# Patient Record
Sex: MALE | Race: WHITE | NOT HISPANIC OR LATINO | Employment: OTHER | URBAN - METROPOLITAN AREA
[De-identification: names, ages, dates, MRNs, and addresses within clinical notes are randomized per-mention and may not be internally consistent; named-entity substitution may affect disease eponyms.]

---

## 2017-02-07 DIAGNOSIS — L40.8 SEBOPSORIASIS: Primary | ICD-10-CM

## 2017-02-07 DIAGNOSIS — L40.9 PSORIASIS: ICD-10-CM

## 2017-02-07 NOTE — TELEPHONE ENCOUNTER
fluconazole (DIFLUCAN) 150 MG tablet    Date Last Filled: 12/21/2016  QTY: 9    [facility] [place]

## 2017-02-09 RX ORDER — FLUCONAZOLE 150 MG/1
150 TABLET ORAL
Qty: 9 TABLET | Refills: 0 | Status: SHIPPED | OUTPATIENT
Start: 2017-02-09 | End: 2022-09-29

## 2017-02-09 NOTE — TELEPHONE ENCOUNTER
"Another Provider please address in Jeni Thornton PA-C's absence:    Spoke to patient and he stated \"Things really aren't changed since I was seen. They aren't any better at all..\"     Advised he should be seen for a recheck appt and scheduled for follow up.     Ok to refill Fluconazole until seen?...    Please advise. Karolyn Lujan RN    "

## 2017-02-09 NOTE — TELEPHONE ENCOUNTER
Per 12-28-16 dictation:     1. Dermatitis   Apply triamcinolone cream twice daily as needed to rash.   Skin care regimen reviewed with patient: Eliminate harsh soaps, i.e. Dial, zest, irsih spring; Mild soaps such as Cetaphil or Dove sensitive skin, avoid hot or cold showers, aggressive use of emollients including vanicream, cetaphil or cerave discussed with patient.      2. Scalp psoriasis    Apply clobetasol solution with mineral oil to affected area to help soften scaly area. Continue ketoconazole shampoo and alternating with clobetasol shampoo.    Finish course of fluconazole.   Patient does not want to try systemic medication at this time.    Return in one month if not improving.

## 2017-02-22 ENCOUNTER — OFFICE VISIT (OUTPATIENT)
Dept: DERMATOLOGY | Facility: CLINIC | Age: 72
End: 2017-02-22
Payer: COMMERCIAL

## 2017-02-22 VITALS — HEART RATE: 57 BPM | OXYGEN SATURATION: 98 % | SYSTOLIC BLOOD PRESSURE: 141 MMHG | DIASTOLIC BLOOD PRESSURE: 79 MMHG

## 2017-02-22 DIAGNOSIS — L40.9 SCALP PSORIASIS: Primary | ICD-10-CM

## 2017-02-22 PROCEDURE — 11900 INJECT SKIN LESIONS </W 7: CPT | Performed by: PHYSICIAN ASSISTANT

## 2017-02-22 NOTE — PROGRESS NOTES
Suleiman Davis is a 72 year old year old male patient here today for recheck psoriasis on scalp .  Patient reports he has been alternating between clobetasol shampoo and ketoconazole shampoo. He also has finished a course of fluconazole. He only has noticed mild improvement on his scalp. He previous not to do any systemic therapy.  Remainder of the HPI, Meds, PMH, Allergies, FH, and SH was reviewed in chart.    Pertinent Hx:   Psoriasis on scalp   Past Medical History   Diagnosis Date     Basal cell carcinoma        History reviewed. No pertinent past surgical history.     Family History   Problem Relation Age of Onset     CANCER Mother      skin cancer       Social History     Social History     Marital status:      Spouse name: N/A     Number of children: N/A     Years of education: N/A     Occupational History     Not on file.     Social History Main Topics     Smoking status: Never Smoker     Smokeless tobacco: Not on file     Alcohol use Not on file     Drug use: Not on file     Sexual activity: Not on file     Other Topics Concern     Not on file     Social History Narrative       Outpatient Encounter Prescriptions as of 2/22/2017   Medication Sig Dispense Refill     fluconazole (DIFLUCAN) 150 MG tablet Take 1 tablet (150 mg) by mouth every 3 days 9 tablet 0     triamcinolone (KENALOG) 0.1 % cream Apply sparingly to affected area two to three times daily as needed 80 g 1     aspirin 81 MG tablet Take 81 mg by mouth daily       clobetasol propionate 0.05 % SHAM Externally apply topically daily as needed       ketoconazole (NIZORAL) 2 % shampoo Apply to the affected area and wash off after 5 minutes. Use every other day. 120 mL 3     clobetasol propionate 0.05 % SHAM Apply topically daily as needed Apply sparingly to dry scalp once every other day as needed.  Leave in place for 15 minutes then add water, lather and rinse thoroughly. 1 Bottle 1     No facility-administered encounter medications on file as of  2/22/2017.              Review Of Systems  Skin: As above  Eyes: negative  Ears/Nose/Throat: negative  Respiratory: No shortness of breath, dyspnea on exertion, cough, or hemoptysis  Cardiovascular: negative  Gastrointestinal: negative  Genitourinary: negative  Musculoskeletal: negative  Neurologic: negative  Psychiatric: negative  Hematologic/Lymphatic/Immunologic: negative  Endocrine: negative      O:   NAD, WDWN, Alert & Oriented, Mood & Affect wnl, Vitals stable   Here today alone   /79  Pulse 57  SpO2 98%   General appearance normal   Vitals stable   Alert, oriented and in no acute distress      Pink, scaly, thickened plaques on occipital and temporal scalp      Eyes: Conjunctivae/lids:Normal     ENT: Lips    MSK:Normal    Pulm: Breathing Normal    Neuro/Psych: Orientation:Normal; Mood/Affect:Normal  A/P:  1. Scalp psoriasis   IL TAC: PGACAC discussed.  Risks including but not limited to injection site reaction, bruising, no resolution.  All questions answered and entertained to patient s satisfaction.  Informed consent obtained.  IL TAC in concentration of 5 mg/ml was injected ID to scalp psoriasis.  Total injected was  2 ml.  Patient tolerated without complications and given wound care instructions, including not to move product around.  Return in 4 weeks for follow-up and possible additional IL TAC.    Continue alternating shampoos.   Can use tar shampoo as well.   Discussed checking with insurance to see in NB-UVB comb is covered for scalp psoriasis.   Patient does not want to try systemic medication at this time.

## 2017-02-22 NOTE — MR AVS SNAPSHOT
"              After Visit Summary   2017    Suleiman Davis    MRN: [id number]           Patient Information     Date Of Birth          1945        Visit Information        Provider Department      2017 9:40 AM Jeni Thornton PA-C [facility]        Today's Diagnoses     Scalp psoriasis    -  1       Follow-ups after your visit        Who to contact     If you have questions or need follow up information about today's clinic visit or your schedule please contact [facility] directly at [phone].  Normal or non-critical lab and imaging results will be communicated to you by Soma Waterhart, letter or phone within 4 business days after the clinic has received the results. If you do not hear from us within 7 days, please contact the clinic through Datal[employer] or phone. If you have a critical or abnormal lab result, we will notify you by phone as soon as possible.  Submit refill requests through [employer] or call your pharmacy and they will forward the refill request to us. Please allow 3 business days for your refill to be completed.          Additional Information About Your Visit        MyChart Information     Intellution lets you send messages to your doctor, view your test results, renew your prescriptions, schedule appointments and more. To sign up, go to [email or web address] . Click on \"Log in\" on the left side of the screen, which will take you to the Welcome page. Then click on \"Sign up Now\" on the right side of the page.     You will be asked to enter the access code listed below, as well as some personal information. Please follow the directions to create your username and password.     Your access code is: [email or web address]  Expires: 3/6/2017  2:08 PM     Your access code will  in 90 days. If you need help or a new code, please call your [facility] or [phone].        Care EveryWhere ID     This is your Care EveryWhere ID. This could be used by other " organizations to access your [facility] medical records  [id number]        Your Vitals Were     Pulse Pulse Oximetry                57 98%           Blood Pressure from Last 3 Encounters:   02/22/17 141/79   12/28/16 132/79   12/21/16 130/78    Weight from Last 3 Encounters:   No data found for Wt              We Performed the Following     INJECTION INTO SKIN LESIONS <=7     KENALOG PER 10 MG        Primary Care Provider Office Phone # Fax #    Naeem Malcolm [phone]       [facility]  [address] [facility] [address]        Thank you!     Thank you for choosing [facility]  for your care. Our goal is always to provide you with excellent care. Hearing back from our patients is one way we can continue to improve our services. Please take a few minutes to complete the written survey that you may receive in the mail after your visit with us. Thank you!             Your Updated Medication List - Protect others around you: Learn how to safely use, store and throw away your medicines at www.disposemymeds.org.          This list is accurate as of: 2/22/17  1:00 PM.  Always use your most recent med list.                   Brand Name Dispense Instructions for use    aspirin 81 MG tablet      Take 81 mg by mouth daily       * clobetasol propionate 0.05 % Sham      Externally apply topically daily as needed       * clobetasol propionate 0.05 % Sham     1 Bottle    Apply topically daily as needed Apply sparingly to dry scalp once every other day as needed.  Leave in place for 15 minutes then add water, lather and rinse thoroughly.       fluconazole 150 MG tablet    DIFLUCAN    9 tablet    Take 1 tablet (150 mg) by mouth every 3 days       ketoconazole 2 % shampoo    NIZORAL    120 mL    Apply to the affected area and wash off after 5 minutes. Use every other day.       triamcinolone 0.1 % cream    KENALOG    80 g    Apply sparingly to affected area two to three times daily as  needed       * Notice:  This list has 2 medication(s) that are the same as other medications prescribed for you. Read the directions carefully, and ask your doctor or other care provider to review them with you.

## 2017-07-06 ENCOUNTER — OFFICE VISIT (OUTPATIENT)
Dept: DERMATOLOGY | Facility: CLINIC | Age: 72
End: 2017-07-06
Payer: COMMERCIAL

## 2017-07-06 VITALS — DIASTOLIC BLOOD PRESSURE: 73 MMHG | SYSTOLIC BLOOD PRESSURE: 125 MMHG | HEART RATE: 66 BPM | OXYGEN SATURATION: 96 %

## 2017-07-06 DIAGNOSIS — L40.9 PSORIASIS: ICD-10-CM

## 2017-07-06 DIAGNOSIS — L81.4 LENTIGO: ICD-10-CM

## 2017-07-06 DIAGNOSIS — D22.9 NEVUS: Primary | ICD-10-CM

## 2017-07-06 DIAGNOSIS — Z85.828 HISTORY OF BASAL CELL CANCER: ICD-10-CM

## 2017-07-06 DIAGNOSIS — D18.00 ANGIOMA: ICD-10-CM

## 2017-07-06 DIAGNOSIS — L82.1 SEBORRHEIC KERATOSIS: ICD-10-CM

## 2017-07-06 PROCEDURE — 99214 OFFICE O/P EST MOD 30 MIN: CPT | Performed by: PHYSICIAN ASSISTANT

## 2017-07-06 NOTE — NURSING NOTE
Chief Complaint   Patient presents with     Derm Problem     skin check       Vitals:    07/06/17 1032   BP: (!) 121/96   Pulse: 66   SpO2: 96%     Wt Readings from Last 1 Encounters:   No data found for Wt       Victoria Carney LPN.................7/6/2017

## 2017-07-06 NOTE — PROGRESS NOTES
HPI:   Suleiman Davis is a 72 year old male who presents for Full skin cancer screening.  chief complaint  Last Skin Exam: 6 mo ago      1st Baseline: no  Personal HX of Skin Cancer: Yes BCC on left medial canthus 12/2016   Personal HX of Malignant Melanoma: no   Family HX of Skin Cancer / Malignant Melanoma: no  Personal HX of Atypical Moles:   no  Risk factors: Frequent sun exposure; grew up in [place], [place]  New / Changing lesions:no  Social History: Moved to [place] because of job with [employer]  On review of systems, there are no further skin complaints, patient is feeling otherwise well.  See patient intake sheet.  ROS of the following were done and are negative: Constitutional, Eyes, Ears, Nose,   Mouth, Throat, Cardiovascular, Respiratory, GI, Genitourinary, Musculoskeletal,   Psychiatric, Endocrine, Allergic/Immunologic.    PHYSICAL EXAM:   Weight:  BP:   Skin exam performed as follows: Type 2 skin. Mood appropriate  Alert and Oriented X 3. Well developed, well nourished in no distress.  General appearance: Normal  Head including face: Normal  Eyes: conjunctiva and lids: Normal  Mouth: Lips, teeth, gums: Normal  Neck: Normal  Chest-breast/axillae: Normal  Back: Normal  Spleen and liver: Normal  Cardiovascular: Exam of peripheral vascular system by observation for swelling, varicosities, edema: Normal  Genitalia: groin, buttocks: Normal  Extremities: digits/nails (clubbing): Normal  Eccrine and Apocrine glands: Normal  Right upper extremity: Normal  Left upper extremity: Normal  Right lower extremity: Normal  Left lower extremity: Normal  Skin: Scalp and body hair: See below    Pt deferred exam of breasts, groin, buttocks: No    Other physical findings:  1. Multiple pigmented macules on extremities and trunk  2. Multiple pigmented macules on face, trunk and extremities  3. Multiple vascular papules on trunk, arms and legs  4. Multiple scattered keratotic plaques       Except as noted above, no other signs  of skin cancer or melanoma.     ASSESSMENT/PLAN:   Benign Full skin cancer screening today. . Patient with history of BCC  Advised on monthly self exams and 1 year  Patient Education: Appropriate brochures given.    Multiple benign appearing nevi on arms, legs and trunk. Discussed ABCDEs of melanoma and sunscreen.   Multiple lentigos on arms, legs and trunk. Advised benign, no treatment needed.  Multiple scattered angiomas. Advised benign, no treatment needed.   Seborrheic keratosis on arms, legs and trunk. Advised benign, no treatment needed.  Scalp psoriasis - advised on chronic, inflammatory, autoimmune disorder. Approx 4-5% TBSA. Denies any joint sx. Has tried topicals and ILK in the past. Not bothersome to him; he has been using clobetasol scalp solution once per week. He is concerned about potential adrenal suppression with increased use of topical steroids. ILK helped for a few days then returned. He would like to continue present management.   History of BCC on left medial canthus - clear of any signs of recurrence. Continue yearly FSE.         Follow-up: yearly FSE/PRN sooner    1.) Patient was asked about new and changing moles. YES  2.) Patient received a complete physical skin examination: YES  3.) Patient was counseled to perform a monthly self skin examination: YES  Scribed By: Jossie Fontaine MS, PAREMI

## 2017-07-06 NOTE — MR AVS SNAPSHOT
"              After Visit Summary   2017    Suleiman Davis    MRN: [id number]           Patient Information     Date Of Birth          1945        Visit Information        Provider Department      2017 10:30 AM Jossie Fontaine PA-C [facility]        Today's Diagnoses     Nevus    -  1    Lentigo        Angioma        Seborrheic keratosis        History of basal cell cancer        Psoriasis           Follow-ups after your visit        Who to contact     If you have questions or need follow up information about today's clinic visit or your schedule please contact [facility] directly at [phone].  Normal or non-critical lab and imaging results will be communicated to you by [email or web address], letter or phone within 4 business days after the clinic has received the results. If you do not hear from us within 7 days, please contact the clinic through [email or web address] or phone. If you have a critical or abnormal lab result, we will notify you by phone as soon as possible.  Submit refill requests through [employer] or call your pharmacy and they will forward the refill request to us. Please allow 3 business days for your refill to be completed.          Additional Information About Your Visit        MyChart Information     [employer] lets you send messages to your doctor, view your test results, renew your prescriptions, schedule appointments and more. To sign up, go to [email or web address] . Click on \"Log in\" on the left side of the screen, which will take you to the Welcome page. Then click on \"Sign up Now\" on the right side of the page.     You will be asked to enter the access code listed below, as well as some personal information. Please follow the directions to create your username and password.     Your access code is: [email or web address]  Expires: 10/4/2017 12:34 PM     Your access code will  in 90 days. If you need help or a new code, please call your [facility] or [phone].      "   Care EveryWhere ID     This is your Care EveryWhere ID. This could be used by other organizations to access your [facility] medical records  [id number]        Your Vitals Were     Pulse Pulse Oximetry                66 96%           Blood Pressure from Last 3 Encounters:   07/06/17 125/73   02/22/17 141/79   12/28/16 132/79    Weight from Last 3 Encounters:   No data found for Wt              Today, you had the following     No orders found for display       Primary Care Provider Office Phone # Fax #    Naeem MUÑIZ Gold [phone]       [facility]  [address] [facility] [address]        Equal Access to Services     [facility]: Hadii aad ku hadasho Soomaali, waaxda luqadaha, qaybta kaalmada adeegyada, dacia davey . So [facility] [phone].    ATENCIÓN: Si habla español, tiene a mojica disposición servicios gratuitos de asistencia lingüística. Llame al [phone].    We comply with applicable federal civil rights laws and [place] laws. We do not discriminate on the basis of race, color, national origin, age, disability sex, sexual orientation or gender identity.            Thank you!     Thank you for choosing [facility]  for your care. Our goal is always to provide you with excellent care. Hearing back from our patients is one way we can continue to improve our services. Please take a few minutes to complete the written survey that you may receive in the mail after your visit with us. Thank you!             Your Updated Medication List - Protect others around you: Learn how to safely use, store and throw away your medicines at www.disposemymeds.org.          This list is accurate as of: 7/6/17 12:34 PM.  Always use your most recent med list.                   Brand Name Dispense Instructions for use Diagnosis    aspirin 81 MG tablet      Take 81 mg by mouth daily        * clobetasol propionate 0.05 % Sham      Externally apply topically  daily as needed        * clobetasol propionate 0.05 % Sham     1 Bottle    Apply topically daily as needed Apply sparingly to dry scalp once every other day as needed.  Leave in place for 15 minutes then add water, lather and rinse thoroughly.    Sebopsoriasis       fluconazole 150 MG tablet    DIFLUCAN    9 tablet    Take 1 tablet (150 mg) by mouth every 3 days    Psoriasis       ketoconazole 2 % shampoo    NIZORAL    120 mL    Apply to the affected area and wash off after 5 minutes. Use every other day.    Sebopsoriasis       triamcinolone 0.1 % cream    KENALOG    80 g    Apply sparingly to affected area two to three times daily as needed    Dermatitis       * Notice:  This list has 2 medication(s) that are the same as other medications prescribed for you. Read the directions carefully, and ask your doctor or other care provider to review them with you.

## 2018-04-30 ENCOUNTER — TELEPHONE (OUTPATIENT)
Dept: DERMATOLOGY | Facility: CLINIC | Age: 73
End: 2018-04-30

## 2018-04-30 ENCOUNTER — OFFICE VISIT (OUTPATIENT)
Dept: DERMATOLOGY | Facility: CLINIC | Age: 73
End: 2018-04-30
Payer: COMMERCIAL

## 2018-04-30 VITALS — HEART RATE: 56 BPM | SYSTOLIC BLOOD PRESSURE: 134 MMHG | OXYGEN SATURATION: 95 % | DIASTOLIC BLOOD PRESSURE: 72 MMHG

## 2018-04-30 DIAGNOSIS — L72.0 EIC (EPIDERMAL INCLUSION CYST): ICD-10-CM

## 2018-04-30 DIAGNOSIS — L57.0 AK (ACTINIC KERATOSIS): ICD-10-CM

## 2018-04-30 DIAGNOSIS — L82.1 SK (SEBORRHEIC KERATOSIS): ICD-10-CM

## 2018-04-30 DIAGNOSIS — L40.9 SCALP PSORIASIS: Primary | ICD-10-CM

## 2018-04-30 DIAGNOSIS — L81.4 LENTIGO: ICD-10-CM

## 2018-04-30 PROCEDURE — 99213 OFFICE O/P EST LOW 20 MIN: CPT | Mod: 25 | Performed by: DERMATOLOGY

## 2018-04-30 PROCEDURE — 88331 PATH CONSLTJ SURG 1 BLK 1SPC: CPT | Performed by: DERMATOLOGY

## 2018-04-30 PROCEDURE — 11100 HC BIOPSY SKIN/SUBQ/MUC MEM, SINGLE LESION: CPT | Performed by: DERMATOLOGY

## 2018-04-30 RX ORDER — CALCIPOTRIENE 0.05 MG/ML
SOLUTION TOPICAL
Qty: 120 ML | Refills: 11 | Status: SHIPPED | OUTPATIENT
Start: 2018-04-30 | End: 2023-10-02

## 2018-04-30 RX ORDER — KETOCONAZOLE 20 MG/ML
SHAMPOO TOPICAL DAILY PRN
Qty: 240 ML | Refills: 11 | Status: SHIPPED | OUTPATIENT
Start: 2018-04-30 | End: 2023-10-02

## 2018-04-30 RX ORDER — CLOBETASOL PROPIONATE 0.5 MG/ML
SOLUTION TOPICAL 2 TIMES DAILY
Qty: 100 ML | Refills: 11 | Status: SHIPPED | OUTPATIENT
Start: 2018-04-30 | End: 2023-10-02

## 2018-04-30 NOTE — PATIENT INSTRUCTIONS
Wound Care Instructions     FOR SUPERFICIAL WOUNDS     [facility] [phone]    [facility] [phone]                       AFTER 24 HOURS YOU SHOULD REMOVE THE BANDAGE AND BEGIN DAILY DRESSING CHANGES AS FOLLOWS:     1) Remove Dressing.     2) Clean and dry the area with tap water using a Q-tip or sterile gauze pad.     3) Apply Vaseline, Aquaphor, Polysporin ointment or Bacitracin ointment over entire wound.  Do NOT use Neosporin ointment.     4) Cover the wound with a band-aid, or a sterile non-stick gauze pad and micropore paper tape      REPEAT THESE INSTRUCTIONS AT LEAST ONCE A DAY UNTIL THE WOUND HAS COMPLETELY HEALED.    It is an old wives tale that a wound heals better when it is exposed to air and allowed to dry out. The wound will heal faster with a better cosmetic result if it is kept moist with ointment and covered with a bandage.    **Do not let the wound dry out.**      Supplies Needed:      *Cotton tipped applicators (Q-tips)    *Polysporin Ointment or Bacitracin Ointment (NOT NEOSPORIN)    *Band-aids or non-stick gauze pads and micropore paper tape.      PATIENT INFORMATION:    During the healing process you will notice a number of changes. All wounds develop a small halo of redness surrounding the wound.  This means healing is occurring. Severe itching with extensive redness usually indicates sensitivity to the ointment or bandage tape used to dress the wound.  You should call our office if this develops.      Swelling  and/or discoloration around your surgical site is common, particularly when performed around the eye.    All wounds normally drain.  The larger the wound the more drainage there will be.  After 7-10 days, you will notice the wound beginning to shrink and new skin will begin to grow.  The wound is healed when you can see skin has formed over the entire area.  A healed wound has a healthy, shiny look to the surface and is red to dark pink in color  to normalize.  Wounds may take approximately 4-6 weeks to heal.  Larger wounds may take 6-8 weeks.  After the wound is healed you may discontinue dressing changes.    You may experience a sensation of tightness as your wound heals. This is normal and will gradually subside.    Your healed wound may be sensitive to temperature changes. This sensitivity improves with time, but if you re having a lot of discomfort, try to avoid temperature extremes.    Patients frequently experience itching after their wound appears to have healed because of the continue healing under the skin.  Plain Vaseline will help relieve the itching.        POSSIBLE COMPLICATIONS    BLEEDIN. Leave the bandage in place.  2. Use tightly rolled up gauze or a cloth to apply direct pressure over the bandage for 30  minutes.  3. Reapply pressure for an additional 30 minutes if necessary  4. Use additional gauze and tape to maintain pressure once the bleeding has stopped.

## 2018-04-30 NOTE — PROGRESS NOTES
Suleiman Davis is a 73 year old year old male patient here today for tender spot on right axilla, bleeding spot onleft ear.  He also has scalp psoriasis using nizoral and clobetasol with some help.  He is not interested in oral or other treatments.   .  Patient states this has been present for months on ear.  Patient reports the following symptoms:  bleeding.  Patient reports the following previous treatments none.  Patient reports the following modifying factors none.  Associated symptoms: none.  Patient has no other skin complaints today.  Remainder of the HPI, Meds, PMH, Allergies, FH, and SH was reviewed in chart.      Past Medical History:   Diagnosis Date     Basal cell carcinoma        History reviewed. No pertinent surgical history.     Family History   Problem Relation Age of Onset     CANCER Mother      skin cancer       Social History     Social History     Marital status:      Spouse name: N/A     Number of children: N/A     Years of education: N/A     Occupational History     Not on file.     Social History Main Topics     Smoking status: Never Smoker     Smokeless tobacco: Never Used     Alcohol use Not on file     Drug use: Not on file     Sexual activity: Not on file     Other Topics Concern     Not on file     Social History Narrative       Outpatient Encounter Prescriptions as of 4/30/2018   Medication Sig Dispense Refill     aspirin 81 MG tablet Take 81 mg by mouth daily       clobetasol propionate 0.05 % SHAM Externally apply topically daily as needed       clobetasol propionate 0.05 % SHAM Apply topically daily as needed Apply sparingly to dry scalp once every other day as needed.  Leave in place for 15 minutes then add water, lather and rinse thoroughly. 1 Bottle 1     fluconazole (DIFLUCAN) 150 MG tablet Take 1 tablet (150 mg) by mouth every 3 days 9 tablet 0     ketoconazole (NIZORAL) 2 % shampoo Apply to the affected area and wash off after 5 minutes. Use every other day. 120 mL 3      triamcinolone (KENALOG) 0.1 % cream Apply sparingly to affected area two to three times daily as needed 80 g 1     No facility-administered encounter medications on file as of 4/30/2018.              Review Of Systems  Skin: As above  Eyes: negative  Ears/Nose/Throat: negative  Respiratory: No shortness of breath, dyspnea on exertion, cough, or hemoptysis  Cardiovascular: negative  Gastrointestinal: negative  Genitourinary: negative  Musculoskeletal: negative  Neurologic: negative  Psychiatric: negative  Hematologic/Lymphatic/Immunologic: negative  Endocrine: negative      O:   NAD, WDWN, Alert & Oriented, Mood & Affect wnl, Vitals stable   Here today alone   /72 (BP Location: Left arm, Patient Position: Sitting, Cuff Size: Adult Regular)  Pulse 56  SpO2 95%   General appearance normal   Vitals stable   Alert, oriented and in no acute distress     R axilla firm nodule with comedones  Scalp with red plaques   L tragus red eroded scaly papule    Stuck on papules and brown macules on trunk and ext    The remainder of expanded problem focused exam was unremarkable; the following areas were examined:  scalp/hair, conjunctiva/lids, face, neck, lips, back, chest, chest, digits/nails, RUE, LUE.      Eyes: Conjunctivae/lids:Normal     ENT: Lips, buccal mucosa, tongue: normal    MSK:Normal    Cardiovascular: peripheral edema none    Pulm: Breathing Normal    Neuro/Psych: Orientation:Normal; Mood/Affect:Normal      MICRO:     L tragus:There is hyperkeratosis and focal parakeratosis of the epidermis, overlying atypical keratinocytes,  by areas of orthokeratosis, there are scattered basal atypical keratinocytes: with varying degrees of overlying loss of maturation, hyperchromatism, pleomorphism, increased and abnormal mitoses, dyskeratosis.  The dermis shows a variable inflammatory infiltrate.   A/P:  1. L tragus r/o squamous cell carcinoma   TANGENTIAL BIOPSY IN HOUSE:  After consent, anesthesia with LEC and  prep, tangential excision performed and dx above confirmed with frozen section histology.  No complications and routine wound care.  Patient told result actinic keratosis cryo   2. Epidermal cyst schedule excision   3. Seborrheic keratosis, letnigo  4. Scalp psoriasis  mtx discussed with patient he declines  dovonex discussed with patient add topical dovonex  .      BENIGN LESIONS DISCUSSED WITH PATIENT:  I discussed the specifics of tumor, prognosis, and genetics of benign lesions.  I explained that treatment of these lesions would be purely cosmetic and not medically neccessary.  I discussed with patient different removal options including excision, cautery and /or laser.      Nature and genetics of benign skin lesions dicussed with patient.  Signs and Symptoms of skin cancer discussed with patient.  Patient encouraged to perform monthly skin exams.  UV precautions reviewed with patient.  Patient to follow up with Primary Care provider regarding elevated blood pressure.  Skin care regimen reviewed with patient: Eliminate harsh soaps, i.e. Dial, zest, irsih spring; Mild soaps such as Cetaphil or Dove sensitive skin, avoid hot or cold showers, aggressive use of emollients including vanicream, cetaphil or cerave discussed with patient.    Risks of non-melanoma skin cancer discussed with patient   Return to clinic for eic excision

## 2018-04-30 NOTE — TELEPHONE ENCOUNTER
----- Message from Eusebio Rivera MD sent at 4/30/2018 12:11 PM CDT -----  L tragus actinic keratosis cryo

## 2018-04-30 NOTE — LETTER
4/30/2018         RE: Suleiman Davis  [address]   [place] [address]        Dear Colleague,    Thank you for referring your patient, Suleiman Davis, to the [facility]. Please see a copy of my visit note below.    Suleiman Davis is a 73 year old year old male patient here today for tender spot on right axilla, bleeding spot onleft ear.  He also has scalp psoriasis using nizoral and clobetasol with some help.  He is not interested in oral or other treatments.   .  Patient states this has been present for months on ear.  Patient reports the following symptoms:  bleeding.  Patient reports the following previous treatments none.  Patient reports the following modifying factors none.  Associated symptoms: none.  Patient has no other skin complaints today.  Remainder of the HPI, Meds, PMH, Allergies, FH, and SH was reviewed in chart.      Past Medical History:   Diagnosis Date     Basal cell carcinoma        History reviewed. No pertinent surgical history.     Family History   Problem Relation Age of Onset     CANCER Mother      skin cancer       Social History     Social History     Marital status:      Spouse name: N/A     Number of children: N/A     Years of education: N/A     Occupational History     Not on file.     Social History Main Topics     Smoking status: Never Smoker     Smokeless tobacco: Never Used     Alcohol use Not on file     Drug use: Not on file     Sexual activity: Not on file     Other Topics Concern     Not on file     Social History Narrative       Outpatient Encounter Prescriptions as of 4/30/2018   Medication Sig Dispense Refill     aspirin 81 MG tablet Take 81 mg by mouth daily       clobetasol propionate 0.05 % SHAM Externally apply topically daily as needed       clobetasol propionate 0.05 % SHAM Apply topically daily as needed Apply sparingly to dry scalp once every other day as needed.  Leave in place for 15 minutes then add water, lather and rinse thoroughly. 1 Bottle 1      fluconazole (DIFLUCAN) 150 MG tablet Take 1 tablet (150 mg) by mouth every 3 days 9 tablet 0     ketoconazole (NIZORAL) 2 % shampoo Apply to the affected area and wash off after 5 minutes. Use every other day. 120 mL 3     triamcinolone (KENALOG) 0.1 % cream Apply sparingly to affected area two to three times daily as needed 80 g 1     No facility-administered encounter medications on file as of 4/30/2018.              Review Of Systems  Skin: As above  Eyes: negative  Ears/Nose/Throat: negative  Respiratory: No shortness of breath, dyspnea on exertion, cough, or hemoptysis  Cardiovascular: negative  Gastrointestinal: negative  Genitourinary: negative  Musculoskeletal: negative  Neurologic: negative  Psychiatric: negative  Hematologic/Lymphatic/Immunologic: negative  Endocrine: negative      O:   NAD, WDWN, Alert & Oriented, Mood & Affect wnl, Vitals stable   Here today alone   /72 (BP Location: Left arm, Patient Position: Sitting, Cuff Size: Adult Regular)  Pulse 56  SpO2 95%   General appearance normal   Vitals stable   Alert, oriented and in no acute distress     R axilla firm nodule with comedones  Scalp with red plaques   L tragus red eroded scaly papule    Stuck on papules and brown macules on trunk and ext    The remainder of expanded problem focused exam was unremarkable; the following areas were examined:  scalp/hair, conjunctiva/lids, face, neck, lips, back, chest, chest, digits/nails, RUE, LUE.      Eyes: Conjunctivae/lids:Normal     ENT: Lips, buccal mucosa, tongue: normal    MSK:Normal    Cardiovascular: peripheral edema none    Pulm: Breathing Normal    Neuro/Psych: Orientation:Normal; Mood/Affect:Normal      MICRO:     L tragus:There is hyperkeratosis and focal parakeratosis of the epidermis, overlying atypical keratinocytes,  by areas of orthokeratosis, there are scattered basal atypical keratinocytes: with varying degrees of overlying loss of maturation, hyperchromatism,  pleomorphism, increased and abnormal mitoses, dyskeratosis.  The dermis shows a variable inflammatory infiltrate.   A/P:  1. L tragus r/o squamous cell carcinoma   TANGENTIAL BIOPSY IN HOUSE:  After consent, anesthesia with LEC and prep, tangential excision performed and dx above confirmed with frozen section histology.  No complications and routine wound care.  Patient told result actinic keratosis cryo   2. Epidermal cyst schedule excision   3. Seborrheic keratosis, letnigo  4. Scalp psoriasis  mtx discussed with patient he declines  dovonex discussed with patient add topical dovonex  .      BENIGN LESIONS DISCUSSED WITH PATIENT:  I discussed the specifics of tumor, prognosis, and genetics of benign lesions.  I explained that treatment of these lesions would be purely cosmetic and not medically neccessary.  I discussed with patient different removal options including excision, cautery and /or laser.      Nature and genetics of benign skin lesions dicussed with patient.  Signs and Symptoms of skin cancer discussed with patient.  Patient encouraged to perform monthly skin exams.  UV precautions reviewed with patient.  Patient to follow up with Primary Care provider regarding elevated blood pressure.  Skin care regimen reviewed with patient: Eliminate harsh soaps, i.e. Dial, zest, irsih spring; Mild soaps such as Cetaphil or Dove sensitive skin, avoid hot or cold showers, aggressive use of emollients including vanicream, cetaphil or cerave discussed with patient.    Risks of non-melanoma skin cancer discussed with patient   Return to clinic for eic excision       Again, thank you for allowing me to participate in the care of your patient.        Sincerely,        Eusebio Rivera MD

## 2018-04-30 NOTE — MR AVS SNAPSHOT
After Visit Summary   4/30/2018    Suleiman Davis    MRN: [id number]           Patient Information     Date Of Birth          1945        Visit Information        Provider Department      4/30/2018 11:45 AM Eusebio Rivera MD [facility]        Care Instructions          Wound Care Instructions     FOR SUPERFICIAL WOUNDS     [facility] [phone]    [facility] [phone]                       AFTER 24 HOURS YOU SHOULD REMOVE THE BANDAGE AND BEGIN DAILY DRESSING CHANGES AS FOLLOWS:     1) Remove Dressing.     2) Clean and dry the area with tap water using a Q-tip or sterile gauze pad.     3) Apply Vaseline, Aquaphor, Polysporin ointment or Bacitracin ointment over entire wound.  Do NOT use Neosporin ointment.     4) Cover the wound with a band-aid, or a sterile non-stick gauze pad and micropore paper tape      REPEAT THESE INSTRUCTIONS AT LEAST ONCE A DAY UNTIL THE WOUND HAS COMPLETELY HEALED.    It is an old wives tale that a wound heals better when it is exposed to air and allowed to dry out. The wound will heal faster with a better cosmetic result if it is kept moist with ointment and covered with a bandage.    **Do not let the wound dry out.**      Supplies Needed:      *Cotton tipped applicators (Q-tips)    *Polysporin Ointment or Bacitracin Ointment (NOT NEOSPORIN)    *Band-aids or non-stick gauze pads and micropore paper tape.      PATIENT INFORMATION:    During the healing process you will notice a number of changes. All wounds develop a small halo of redness surrounding the wound.  This means healing is occurring. Severe itching with extensive redness usually indicates sensitivity to the ointment or bandage tape used to dress the wound.  You should call our office if this develops.      Swelling  and/or discoloration around your surgical site is common, particularly when performed around the eye.    All wounds normally drain.  The larger  the wound the more drainage there will be.  After 7-10 days, you will notice the wound beginning to shrink and new skin will begin to grow.  The wound is healed when you can see skin has formed over the entire area.  A healed wound has a healthy, shiny look to the surface and is red to dark pink in color to normalize.  Wounds may take approximately 4-6 weeks to heal.  Larger wounds may take 6-8 weeks.  After the wound is healed you may discontinue dressing changes.    You may experience a sensation of tightness as your wound heals. This is normal and will gradually subside.    Your healed wound may be sensitive to temperature changes. This sensitivity improves with time, but if you re having a lot of discomfort, try to avoid temperature extremes.    Patients frequently experience itching after their wound appears to have healed because of the continue healing under the skin.  Plain Vaseline will help relieve the itching.        POSSIBLE COMPLICATIONS    BLEEDIN. Leave the bandage in place.  2. Use tightly rolled up gauze or a cloth to apply direct pressure over the bandage for 30  minutes.  3. Reapply pressure for an additional 30 minutes if necessary  4. Use additional gauze and tape to maintain pressure once the bleeding has stopped.            Follow-ups after your visit        Who to contact     If you have questions or need follow up information about today's clinic visit or your schedule please contact [facility] directly at [phone].  Normal or non-critical lab and imaging results will be communicated to you by Pix[employer], letter or phone within 4 business days after the clinic has received the results. If you do not hear from us within 7 days, please contact the clinic through MyChart or phone. If you have a critical or abnormal lab result, we will notify you by phone as soon as possible.  Submit refill requests through [employer] or call your pharmacy and they will forward the refill  "request to us. Please allow 3 business days for your refill to be completed.          Additional Information About Your Visit        MyChart Information     [employer] lets you send messages to your doctor, view your test results, renew your prescriptions, schedule appointments and more. To sign up, go to [email or web address] . Click on \"Log in\" on the left side of the screen, which will take you to the Welcome page. Then click on \"Sign up Now\" on the right side of the page.     You will be asked to enter the access code listed below, as well as some personal information. Please follow the directions to create your username and password.     Your access code is: [email or web address]  Expires: 2018 11:54 AM     Your access code will  in 90 days. If you need help or a new code, please call your [facility] clinic or [phone].        Care EveryWhere ID     This is your Care EveryWhere ID. This could be used by other organizations to access your [facility] medical records  [id number]        Your Vitals Were     Pulse Pulse Oximetry                56 95%           Blood Pressure from Last 3 Encounters:   18 134/72   17 125/73   17 141/79    Weight from Last 3 Encounters:   No data found for Wt              Today, you had the following     No orders found for display       Primary Care Provider Office Phone # Fax #    Naeem MUÑIZ Gold [phone]       [facility]  [address] [facility] [address]        Equal Access to Services     [facility] HospitalLAMAR : Hadii aad ku hadasho Soomaali, waaxda luqadaha, qaybta kaalmada adeegyada, waxay kayliein mya davey . So [facility] [phone].    ATENCIÓN: Si habla español, tiene a mojica disposición servicios gratuitos de asistencia lingüística. Llame al [phone].    We comply with applicable federal civil rights laws and [place] laws. We do not discriminate on the basis of race, color, national origin, age, disability, sex, " sexual orientation, or gender identity.            Thank you!     Thank you for choosing [facility]  for your care. Our goal is always to provide you with excellent care. Hearing back from our patients is one way we can continue to improve our services. Please take a few minutes to complete the written survey that you may receive in the mail after your visit with us. Thank you!             Your Updated Medication List - Protect others around you: Learn how to safely use, store and throw away your medicines at www.disposemymeds.org.          This list is accurate as of 4/30/18 11:54 AM.  Always use your most recent med list.                   Brand Name Dispense Instructions for use Diagnosis    aspirin 81 MG tablet      Take 81 mg by mouth daily        * clobetasol propionate 0.05 % Sham      Externally apply topically daily as needed        * clobetasol propionate 0.05 % Sham     1 Bottle    Apply topically daily as needed Apply sparingly to dry scalp once every other day as needed.  Leave in place for 15 minutes then add water, lather and rinse thoroughly.    Sebopsoriasis       fluconazole 150 MG tablet    DIFLUCAN    9 tablet    Take 1 tablet (150 mg) by mouth every 3 days    Psoriasis       ketoconazole 2 % shampoo    NIZORAL    120 mL    Apply to the affected area and wash off after 5 minutes. Use every other day.    Sebopsoriasis       triamcinolone 0.1 % cream    KENALOG    80 g    Apply sparingly to affected area two to three times daily as needed    Dermatitis       * Notice:  This list has 2 medication(s) that are the same as other medications prescribed for you. Read the directions carefully, and ask your doctor or other care provider to review them with you.

## 2018-04-30 NOTE — NURSING NOTE
Initial /72 (BP Location: Left arm, Patient Position: Sitting, Cuff Size: Adult Regular)  Pulse 56  SpO2 95% There is no height or weight on file to calculate BMI. .

## 2018-05-01 NOTE — TELEPHONE ENCOUNTER
Message left to return call. Needs return appt with any Derm Provider to treat and can use hold spot. Karolyn Lujan RN

## 2018-05-01 NOTE — TELEPHONE ENCOUNTER
Pt returned call and was advised to schedule an ov with any of the derm providers for treatment.  Trans to scheduling to make this arrangement.    Sandee Chanel  [facility] RN

## 2018-05-10 ENCOUNTER — OFFICE VISIT (OUTPATIENT)
Dept: DERMATOLOGY | Facility: CLINIC | Age: 73
End: 2018-05-10
Payer: COMMERCIAL

## 2018-05-10 DIAGNOSIS — L57.0 ACTINIC KERATOSIS: Primary | ICD-10-CM

## 2018-05-10 PROCEDURE — 17000 DESTRUCT PREMALG LESION: CPT | Performed by: PHYSICIAN ASSISTANT

## 2018-05-10 NOTE — MR AVS SNAPSHOT
After Visit Summary   5/10/2018    Suleiman Davis    MRN: [id number]           Patient Information     Date Of Birth          1945        Visit Information        Provider Department      5/10/2018 2:20 PM Jeni Thornton PA-C [facility]        Today's Diagnoses     Actinic keratosis    -  1      Care Instructions    WOUND CARE INSTRUCTIONS   FOR CRYOSURGERY   This area treated with liquid nitrogen will form a blister. You do not need to bandage the area until after the blister forms and breaks (which may be a few days). When the blister breaks, begin daily dressing changes as follows:   1) Clean and dry the area with tap water using clean Q-tip or sterile gauze pad.   2) Apply Polysporin ointment or Bacitracin ointment over entire wound. Do NOT use Neosporin ointment.   3) Cover the wound with a band-aid or sterile non-stick gauze pad and micropore paper tape.   REPEAT THESE INSTRUCTIONS AT LEAST ONCE A DAY UNTIL THE WOUND HAS COMPLETELY HEALED.   It is an old wives tale that a wound heals better when it is exposed to air and allowed to dry out. The wound will heal faster with a better cosmetic result if it is kept moist with ointment and covered with a bandage.   Do not let the wound dry out.   IMPORTANT INFORMATION ON REVERSE SIDE   Supplies Needed:   *Cotton tipped applicators (Q-tips)   *Polysporin ointment or Bacitracin ointment (NOT NEOSPORIN)   *Band-aids, or non stick gauze pads and micropore paper tape   PATIENT INFORMATION   During the healing process you will notice a number of changes. All wounds develop a small halo of redness surrounding the wound. This means healing is occurring. Severe itching with extensive redness usually indicates sensitivity to the ointment or bandage tape used to dress the wound. You should call our office if this develops.   Swelling and/or discoloration around your surgical site is common, particularly when performed around the eye.    All wounds normally drain. The larger the wound the more drainage there will be. After 7-10 days, you will notice the wound beginning to shrink and new skin will begin to grow. The wound is healed when you can see skin has formed over the entire area. A healed wound has a healthy, shiny look to the surface and is red to dark pink in color to normalize. Wounds may take approximately 4-6 weeks to heal. Larger wounds may take 6-8 weeks. After the wound is healed you may discontinue dressing changes.   You may experience a sensation of tightness as your wound heals. This is normal and will gradually subside.   Your healed wound may be sensitive to temperature changes. This sensitivity improves with time, but if you re having a lot of discomfort, try to avoid temperature extremes.   Patients frequently experience itching after their wound appears to have healed because of the continue healing under the skin. Plain Vaseline will help relieve the itching.                 Follow-ups after your visit        Your next 10 appointments already scheduled     Jul 10, 2018  8:15 AM CDT   PROCEDURE with Eusebio Rivera MD   [facility] ([facility]    [address] [id number] [phone]              Who to contact     If you have questions or need follow up information about today's clinic visit or your schedule please contact [facility] directly at [phone].  Normal or non-critical lab and imaging results will be communicated to you by MyChart, letter or phone within 4 business days after the clinic has received the results. If you do not hear from us within 7 days, please contact the clinic through MyChart or phone. If you have a critical or abnormal lab result, we will notify you by phone as soon as possible.  Submit refill requests through GetThis or call your pharmacy and they will forward the refill request to us. Please allow 3 business days for  your refill to be completed.          Additional Information About Your Visit        MyChart Information     [facility] gives you secure access to your electronic health record. If you see a primary care provider, you can also send messages to your care team and make appointments. If you have questions, please call your primary care clinic.  If you do not have a primary care provider, please call [phone] and they will assist you.        Care EveryWhere ID     This is your Care EveryWhere ID. This could be used by other organizations to access your [facility] medical records  [id number]         Blood Pressure from Last 3 Encounters:   04/30/18 134/72   07/06/17 125/73   02/22/17 141/79    Weight from Last 3 Encounters:   No data found for Wt              We Performed the Following     DESTRUCT PREMALIGNANT LESION, FIRST        Primary Care Provider Office Phone # Fax #    Naeem MUÑIZ Gold [phone]       [facility] [address] [address]        Equal Access to Services     YVONNE DUTTON : Hadii aad ku hadasho Soomaali, waaxda luqadaha, qaybta kaalmada adeegyada, waxay idiin hayyaiman nancy davey . So [facility] [phone].    ATENCIÓN: Si habla español, tiene a mojica disposición servicios gratuitos de asistencia lingüística. Llame al [phone].    We comply with applicable federal civil rights laws and [place] laws. We do not discriminate on the basis of race, color, national origin, age, disability, sex, sexual orientation, or gender identity.            Thank you!     Thank you for choosing [facility]  for your care. Our goal is always to provide you with excellent care. Hearing back from our patients is one way we can continue to improve our services. Please take a few minutes to complete the written survey that you may receive in the mail after your visit with us. Thank you!             Your Updated Medication List - Protect others around you: Learn  how to safely use, store and throw away your medicines at www.disposemymeds.org.          This list is accurate as of 5/10/18 10:48 PM.  Always use your most recent med list.                   Brand Name Dispense Instructions for use Diagnosis    aspirin 81 MG tablet      Take 81 mg by mouth daily        calcipotriene 0.005 % Soln solution    DOVONOX    120 mL    Apply twice daily    Scalp psoriasis, EIC (epidermal inclusion cyst), AK (actinic keratosis), SK (seborrheic keratosis), Lentigo       * clobetasol propionate 0.05 % Sham      Externally apply topically daily as needed        * clobetasol propionate 0.05 % Sham     1 Bottle    Apply topically daily as needed Apply sparingly to dry scalp once every other day as needed.  Leave in place for 15 minutes then add water, lather and rinse thoroughly.    Sebopsoriasis       * clobetasol 0.05 % external solution    TEMOVATE    100 mL    Apply topically 2 times daily    Scalp psoriasis, EIC (epidermal inclusion cyst), AK (actinic keratosis), SK (seborrheic keratosis), Lentigo       fluconazole 150 MG tablet    DIFLUCAN    9 tablet    Take 1 tablet (150 mg) by mouth every 3 days    Psoriasis       * ketoconazole 2 % shampoo    NIZORAL    120 mL    Apply to the affected area and wash off after 5 minutes. Use every other day.    Sebopsoriasis       * ketoconazole 2 % shampoo    NIZORAL    240 mL    Apply topically daily as needed for itching or irritation wsah scalp daily    Scalp psoriasis, EIC (epidermal inclusion cyst), AK (actinic keratosis), SK (seborrheic keratosis), Lentigo       triamcinolone 0.1 % cream    KENALOG    80 g    Apply sparingly to affected area two to three times daily as needed    Dermatitis       * Notice:  This list has 5 medication(s) that are the same as other medications prescribed for you. Read the directions carefully, and ask your doctor or other care provider to review them with you.

## 2018-05-10 NOTE — LETTER
5/10/2018         RE: Suleiman Davis  [address]   [place] [address]        Dear Colleague,    Thank you for referring your patient, Suleiman Davis, to the [facility]. Please see a copy of my visit note below.    Patient is here today to treat actinic keratosis on left tragus.   Patient had biopsy on 4/30 and he is here today for cryo.   1. Actinic Keratosis on left tragus x 1  LN2:  Treated with LN2 for 5s for 1-2 cycles. Warned risks of blistering, pain, pigment change, scarring, and incomplete resolution.  Advised patient to return if lesions do not completely resolve.  Wound care sheet given.      Again, thank you for allowing me to participate in the care of your patient.        Sincerely,        Jeni Miranda PA-C

## 2018-05-11 NOTE — PROGRESS NOTES
Patient is here today to treat actinic keratosis on left tragus.   Patient had biopsy on 4/30 and he is here today for cryo.   1. Actinic Keratosis on left tragus x 1  LN2:  Treated with LN2 for 5s for 1-2 cycles. Warned risks of blistering, pain, pigment change, scarring, and incomplete resolution.  Advised patient to return if lesions do not completely resolve.  Wound care sheet given.

## 2020-03-10 ENCOUNTER — HEALTH MAINTENANCE LETTER (OUTPATIENT)
Age: 75
End: 2020-03-10

## 2020-12-27 ENCOUNTER — HEALTH MAINTENANCE LETTER (OUTPATIENT)
Age: 75
End: 2020-12-27

## 2021-04-24 ENCOUNTER — HEALTH MAINTENANCE LETTER (OUTPATIENT)
Age: 76
End: 2021-04-24

## 2021-05-21 ENCOUNTER — OFFICE VISIT (OUTPATIENT)
Dept: DERMATOLOGY | Facility: CLINIC | Age: 76
End: 2021-05-21
Payer: COMMERCIAL

## 2021-05-21 VITALS — SYSTOLIC BLOOD PRESSURE: 150 MMHG | HEART RATE: 60 BPM | OXYGEN SATURATION: 97 % | DIASTOLIC BLOOD PRESSURE: 81 MMHG

## 2021-05-21 DIAGNOSIS — D22.9 MULTIPLE BENIGN NEVI: ICD-10-CM

## 2021-05-21 DIAGNOSIS — D18.01 CHERRY ANGIOMA: ICD-10-CM

## 2021-05-21 DIAGNOSIS — L57.0 ACTINIC KERATOSIS: Primary | ICD-10-CM

## 2021-05-21 DIAGNOSIS — L81.4 LENTIGO: ICD-10-CM

## 2021-05-21 DIAGNOSIS — L82.1 SEBORRHEIC KERATOSIS: ICD-10-CM

## 2021-05-21 PROCEDURE — 17000 DESTRUCT PREMALG LESION: CPT | Performed by: PHYSICIAN ASSISTANT

## 2021-05-21 PROCEDURE — 99213 OFFICE O/P EST LOW 20 MIN: CPT | Mod: 25 | Performed by: PHYSICIAN ASSISTANT

## 2021-05-21 PROCEDURE — 17003 DESTRUCT PREMALG LES 2-14: CPT | Performed by: PHYSICIAN ASSISTANT

## 2021-05-21 NOTE — PROGRESS NOTES
Suleiman Davis is an extremely pleasant 76 year old year old male patient here today for spot on left cheek. Present for a few months. He denies any pain or bleeding. He reports it was treated with cryo once by PCP but did not fully resolve.  Patient has no other skin complaints today.  Remainder of the HPI, Meds, PMH, Allergies, FH, and SH was reviewed in chart.    Pertinent Hx:   History of BCC  Past Medical History:   Diagnosis Date     Basal cell carcinoma        History reviewed. No pertinent surgical history.     Family History   Problem Relation Age of Onset     Cancer Mother         skin cancer       Social History     Socioeconomic History     Marital status:      Spouse name: Not on file     Number of children: Not on file     Years of education: Not on file     Highest education level: Not on file   Occupational History     Not on file   Social Needs     Financial resource strain: Not on file     Food insecurity     Worry: Not on file     Inability: Not on file     Transportation needs     Medical: Not on file     Non-medical: Not on file   Tobacco Use     Smoking status: Never Smoker     Smokeless tobacco: Never Used   Substance and Sexual Activity     Alcohol use: Not on file     Drug use: Not on file     Sexual activity: Not on file   Lifestyle     Physical activity     Days per week: Not on file     Minutes per session: Not on file     Stress: Not on file   Relationships     Social connections     Talks on phone: Not on file     Gets together: Not on file     Attends [employer] service: Not on file     Active member of club or organization: Not on file     Attends meetings of clubs or organizations: Not on file     Relationship status: Not on file     Intimate partner violence     Fear of current or ex partner: Not on file     Emotionally abused: Not on file     Physically abused: Not on file     Forced sexual activity: Not on file   Other Topics Concern     Not on file   Social History  Narrative     Not on file       Outpatient Encounter Medications as of 5/21/2021   Medication Sig Dispense Refill     aspirin 81 MG tablet Take 81 mg by mouth daily       calcipotriene (DOVONOX) 0.005 % SOLN solution Apply twice daily (Patient not taking: Reported on 5/21/2021) 120 mL 11     clobetasol (TEMOVATE) 0.05 % external solution Apply topically 2 times daily (Patient not taking: Reported on 5/21/2021) 100 mL 11     clobetasol propionate 0.05 % SHAM Externally apply topically daily as needed       clobetasol propionate 0.05 % SHAM Apply topically daily as needed Apply sparingly to dry scalp once every other day as needed.  Leave in place for 15 minutes then add water, lather and rinse thoroughly. (Patient not taking: Reported on 5/21/2021) 1 Bottle 1     fluconazole (DIFLUCAN) 150 MG tablet Take 1 tablet (150 mg) by mouth every 3 days (Patient not taking: Reported on 5/21/2021) 9 tablet 0     ketoconazole (NIZORAL) 2 % shampoo Apply topically daily as needed for itching or irritation wsah scalp daily 240 mL 11     ketoconazole (NIZORAL) 2 % shampoo Apply to the affected area and wash off after 5 minutes. Use every other day. 120 mL 3     triamcinolone (KENALOG) 0.1 % cream Apply sparingly to affected area two to three times daily as needed (Patient not taking: Reported on 5/21/2021) 80 g 1     No facility-administered encounter medications on file as of 5/21/2021.              O:   NAD, WDWN, Alert & Oriented, Mood & Affect wnl, Vitals stable   Here today alone   BP (!) 150/81 (BP Location: Right arm, Cuff Size: Adult Large)   Pulse 60   SpO2 97%    General appearance normal   Vitals stable   Alert, oriented and in no acute distress     Gritty papules x 2 on left lateral cheek and right cheek   Stuck on papules and brown macules on trunk and ext   Red papules on trunk  Brown papules and macules with regular pigment network and borders on torso and extremities      The remainder of skin exam is normal        Eyes: Conjunctivae/lids:Normal     ENT: Lips: normal    MSK:Normal    Cardiovascular: peripheral edema none    Pulm: Breathing Normal    Neuro/Psych: Orientation:Alert and Orientedx3 ; Mood/Affect:normal     A/P:  1. Actinic keratosis x 3 on face  LN2:  Treated with LN2 for 5s for 1-2 cycles. Warned risks of blistering, pain, pigment change, scarring, and incomplete resolution.  Advised patient to return if lesions do not completely resolve.  Wound care sheet given.  Return in one month if not resolved.   2. Seborrheic keratosis, lentigo, angioma, benign nevi, History of BCC  BENIGN LESIONS DISCUSSED WITH PATIENT:  I discussed the specifics of tumor, prognosis, and genetics of benign lesions.  I explained that treatment of these lesions would be purely cosmetic and not medically neccessary.  I discussed with patient different removal options including excision, cautery and /or laser.      Nature and genetics of benign skin lesions dicussed with patient.  Signs and Symptoms of skin cancer discussed with patient.  ABCDEs of melanoma reviewed with patient.  Patient encouraged to perform monthly skin exams.  UV precautions reviewed with patient.  Risks of non-melanoma skin cancer discussed with patient   Return to clinic in one year or sooner if needed.  Suleiman to follow up with Primary Care provider regarding elevated blood pressure.

## 2021-05-21 NOTE — LETTER
5/21/2021         RE: Suleiman Davis  [address] [place] [id number]        Dear Colleague,    Thank you for referring your patient, Suleiman Davis, to the [facility]. Please see a copy of my visit note below.    Suleiman Davis is an extremely pleasant 76 year old year old male patient here today for spot on left cheek. Present for a few months. He denies any pain or bleeding. He reports it was treated with cryo once by PCP but did not fully resolve.  Patient has no other skin complaints today.  Remainder of the HPI, Meds, PMH, Allergies, FH, and SH was reviewed in chart.    Pertinent Hx:   History of BCC  Past Medical History:   Diagnosis Date     Basal cell carcinoma        History reviewed. No pertinent surgical history.     Family History   Problem Relation Age of Onset     Cancer Mother         skin cancer       Social History     Socioeconomic History     Marital status:      Spouse name: Not on file     Number of children: Not on file     Years of education: Not on file     Highest education level: Not on file   Occupational History     Not on file   Social Needs     Financial resource strain: Not on file     Food insecurity     Worry: Not on file     Inability: Not on file     Transportation needs     Medical: Not on file     Non-medical: Not on file   Tobacco Use     Smoking status: Never Smoker     Smokeless tobacco: Never Used   Substance and Sexual Activity     Alcohol use: Not on file     Drug use: Not on file     Sexual activity: Not on file   Lifestyle     Physical activity     Days per week: Not on file     Minutes per session: Not on file     Stress: Not on file   Relationships     Social connections     Talks on phone: Not on file     Gets together: Not on file     Attends Yarsani service: Not on file     Active member of club or organization: Not on file     Attends meetings of clubs or organizations: Not on file     Relationship status: Not on file     Intimate  partner violence     Fear of current or ex partner: Not on file     Emotionally abused: Not on file     Physically abused: Not on file     Forced sexual activity: Not on file   Other Topics Concern     Not on file   Social History Narrative     Not on file       Outpatient Encounter Medications as of 5/21/2021   Medication Sig Dispense Refill     aspirin 81 MG tablet Take 81 mg by mouth daily       calcipotriene (DOVONOX) 0.005 % SOLN solution Apply twice daily (Patient not taking: Reported on 5/21/2021) 120 mL 11     clobetasol (TEMOVATE) 0.05 % external solution Apply topically 2 times daily (Patient not taking: Reported on 5/21/2021) 100 mL 11     clobetasol propionate 0.05 % SHAM Externally apply topically daily as needed       clobetasol propionate 0.05 % SHAM Apply topically daily as needed Apply sparingly to dry scalp once every other day as needed.  Leave in place for 15 minutes then add water, lather and rinse thoroughly. (Patient not taking: Reported on 5/21/2021) 1 Bottle 1     fluconazole (DIFLUCAN) 150 MG tablet Take 1 tablet (150 mg) by mouth every 3 days (Patient not taking: Reported on 5/21/2021) 9 tablet 0     ketoconazole (NIZORAL) 2 % shampoo Apply topically daily as needed for itching or irritation wsah scalp daily 240 mL 11     ketoconazole (NIZORAL) 2 % shampoo Apply to the affected area and wash off after 5 minutes. Use every other day. 120 mL 3     triamcinolone (KENALOG) 0.1 % cream Apply sparingly to affected area two to three times daily as needed (Patient not taking: Reported on 5/21/2021) 80 g 1     No facility-administered encounter medications on file as of 5/21/2021.              O:   NAD, WDWN, Alert & Oriented, Mood & Affect wnl, Vitals stable   Here today alone   BP (!) 150/81 (BP Location: Right arm, Cuff Size: Adult Large)   Pulse 60   SpO2 97%    General appearance normal   Vitals stable   Alert, oriented and in no acute distress     Gritty papules x 2 on left lateral cheek  and right cheek   Stuck on papules and brown macules on trunk and ext   Red papules on trunk  Brown papules and macules with regular pigment network and borders on torso and extremities      The remainder of skin exam is normal       Eyes: Conjunctivae/lids:Normal     ENT: Lips: normal    MSK:Normal    Cardiovascular: peripheral edema none    Pulm: Breathing Normal    Neuro/Psych: Orientation:Alert and Orientedx3 ; Mood/Affect:normal     A/P:  1. Actinic keratosis x 3 on face  LN2:  Treated with LN2 for 5s for 1-2 cycles. Warned risks of blistering, pain, pigment change, scarring, and incomplete resolution.  Advised patient to return if lesions do not completely resolve.  Wound care sheet given.  Return in one month if not resolved.   2. Seborrheic keratosis, lentigo, angioma, benign nevi, History of BCC  BENIGN LESIONS DISCUSSED WITH PATIENT:  I discussed the specifics of tumor, prognosis, and genetics of benign lesions.  I explained that treatment of these lesions would be purely cosmetic and not medically neccessary.  I discussed with patient different removal options including excision, cautery and /or laser.      Nature and genetics of benign skin lesions dicussed with patient.  Signs and Symptoms of skin cancer discussed with patient.  ABCDEs of melanoma reviewed with patient.  Patient encouraged to perform monthly skin exams.  UV precautions reviewed with patient.  Risks of non-melanoma skin cancer discussed with patient   Return to clinic in one year or sooner if needed.  Suleiman to follow up with Primary Care provider regarding elevated blood pressure.        Again, thank you for allowing me to participate in the care of your patient.        Sincerely,        Jeni Miranda PA-C

## 2021-10-09 ENCOUNTER — HEALTH MAINTENANCE LETTER (OUTPATIENT)
Age: 76
End: 2021-10-09

## 2022-05-16 ENCOUNTER — HEALTH MAINTENANCE LETTER (OUTPATIENT)
Age: 77
End: 2022-05-16

## 2022-09-11 ENCOUNTER — HEALTH MAINTENANCE LETTER (OUTPATIENT)
Age: 77
End: 2022-09-11

## 2022-09-29 ENCOUNTER — OFFICE VISIT (OUTPATIENT)
Dept: DERMATOLOGY | Facility: CLINIC | Age: 77
End: 2022-09-29
Payer: COMMERCIAL

## 2022-09-29 DIAGNOSIS — L40.9 PSORIASIS: ICD-10-CM

## 2022-09-29 DIAGNOSIS — D18.01 CHERRY ANGIOMA: ICD-10-CM

## 2022-09-29 DIAGNOSIS — L30.9 DERMATITIS: ICD-10-CM

## 2022-09-29 DIAGNOSIS — L57.0 ACTINIC KERATOSIS: Primary | ICD-10-CM

## 2022-09-29 DIAGNOSIS — L82.1 SEBORRHEIC KERATOSIS: ICD-10-CM

## 2022-09-29 DIAGNOSIS — D22.9 MULTIPLE BENIGN NEVI: ICD-10-CM

## 2022-09-29 DIAGNOSIS — Z85.828 HISTORY OF BASAL CELL CANCER: ICD-10-CM

## 2022-09-29 DIAGNOSIS — L81.4 LENTIGO: ICD-10-CM

## 2022-09-29 PROCEDURE — 99213 OFFICE O/P EST LOW 20 MIN: CPT | Mod: 25 | Performed by: PHYSICIAN ASSISTANT

## 2022-09-29 PROCEDURE — 17000 DESTRUCT PREMALG LESION: CPT | Performed by: PHYSICIAN ASSISTANT

## 2022-09-29 RX ORDER — TRIAMCINOLONE ACETONIDE 1 MG/G
CREAM TOPICAL
Qty: 45 G | Refills: 1 | Status: SHIPPED | OUTPATIENT
Start: 2022-09-29 | End: 2023-10-02

## 2022-09-29 ASSESSMENT — PAIN SCALES - GENERAL: PAINLEVEL: NO PAIN (0)

## 2022-09-29 NOTE — LETTER
9/29/2022         RE: Suleiman Davis  [address] [place] [id number]        Dear Colleague,    Thank you for referring your patient, Suleiman Davis, to the [facility]. Please see a copy of my visit note below.    Suleiman Davis is an extremely pleasant 77 year old year old male patient here today for skin check. He notes brown scaly area on left leg. Noticed a few months ago. No pain or bleeding.  Patient has no other skin complaints today.  Remainder of the HPI, Meds, PMH, Allergies, FH, and SH was reviewed in chart.    Pertinent Hx:  History of BCC  Past Medical History:   Diagnosis Date     Basal cell carcinoma        History reviewed. No pertinent surgical history.     Family History   Problem Relation Age of Onset     Cancer Mother         skin cancer       Social History     Socioeconomic History     Marital status:      Spouse name: Not on file     Number of children: Not on file     Years of education: Not on file     Highest education level: Not on file   Occupational History     Not on file   Tobacco Use     Smoking status: Never Smoker     Smokeless tobacco: Never Used   Substance and Sexual Activity     Alcohol use: Not on file     Drug use: Not on file     Sexual activity: Not on file   Other Topics Concern     Not on file   Social History Narrative     Not on file     Social Determinants of Health     Financial Resource Strain: Not on file   Food Insecurity: Not on file   Transportation Needs: Not on file   Physical Activity: Not on file   Stress: Not on file   Social Connections: Not on file   Intimate Partner Violence: Not on file   Housing Stability: Not on file       Outpatient Encounter Medications as of 9/29/2022   Medication Sig Dispense Refill     aspirin 81 MG tablet Take 81 mg by mouth daily (Patient not taking: Reported on 9/29/2022)       calcipotriene (DOVONOX) 0.005 % SOLN solution Apply twice daily (Patient not taking: No sig reported) 120 mL 11      clobetasol (TEMOVATE) 0.05 % external solution Apply topically 2 times daily (Patient not taking: No sig reported) 100 mL 11     clobetasol propionate 0.05 % SHAM Externally apply topically daily as needed (Patient not taking: Reported on 9/29/2022)       clobetasol propionate 0.05 % SHAM Apply topically daily as needed Apply sparingly to dry scalp once every other day as needed.  Leave in place for 15 minutes then add water, lather and rinse thoroughly. (Patient not taking: No sig reported) 1 Bottle 1     ketoconazole (NIZORAL) 2 % shampoo Apply topically daily as needed for itching or irritation wsah scalp daily 240 mL 11     ketoconazole (NIZORAL) 2 % shampoo Apply to the affected area and wash off after 5 minutes. Use every other day. 120 mL 3     triamcinolone (KENALOG) 0.1 % cream Apply sparingly to affected area two to three times daily as needed (Patient not taking: No sig reported) 80 g 1     [DISCONTINUED] fluconazole (DIFLUCAN) 150 MG tablet Take 1 tablet (150 mg) by mouth every 3 days (Patient not taking: No sig reported) 9 tablet 0     No facility-administered encounter medications on file as of 9/29/2022.             O:   NAD, WDWN, Alert & Oriented, Mood & Affect wnl, Vitals stable   Here today alone   There were no vitals taken for this visit.   General appearance normal   Vitals stable   Alert, oriented and in no acute distress     Stuck on papules and brown macules on trunk and ext   Red papules on trunk  Brown papules and macules with regular pigment network and borders on torso and extremities  Gritty papule on left cheek x 1  psoriasiform plaque on left temporal scalp   The remainder of skin exam is normal       Eyes: Conjunctivae/lids:Normal     ENT: Lips: normal    MSK:Normal    Cardiovascular: peripheral edema none    Pulm: Breathing Normal    Neuro/Psych: Orientation:Alert and Orientedx3 ; Mood/Affect:normal     A/P:  1. Actinic keratosis x 1 on left cheek   LN2:  Treated with LN2 for 5s for  1-2 cycles. Warned risks of blistering, pain, pigment change, scarring, and incomplete resolution.  Advised patient to return if lesions do not completely resolve.  Wound care sheet given.  Return in one month if not resolved.   2. Psoriasis   Refilled triamcinolone if needed.   3. Seborrheic keratosis, lentigo, angioma, benign nevi, History of BCC  BENIGN LESIONS DISCUSSED WITH PATIENT:  I discussed the specifics of tumor, prognosis, and genetics of benign lesions.  I explained that treatment of these lesions would be purely cosmetic and not medically neccessary.  I discussed with patient different removal options including excision, cautery and /or laser.       Nature and genetics of benign skin lesions dicussed with patient.  Signs and Symptoms of skin cancer discussed with patient.  ABCDEs of melanoma reviewed with patient.  Patient encouraged to perform monthly skin exams.  UV precautions reviewed with patient.  Risks of non-melanoma skin cancer discussed with patient   Return to clinic in one year or sooner if needed.      Again, thank you for allowing me to participate in the care of your patient.        Sincerely,        Jeni Miranda PA-C

## 2022-09-29 NOTE — NURSING NOTE
Chief Complaint   Patient presents with     Skin Check     Spot on left calf,growing,wanting provider to look at spot       There were no vitals filed for this visit.  Wt Readings from Last 1 Encounters:   No data found for Wt   Fartun Weber MA

## 2022-09-29 NOTE — PROGRESS NOTES
Suleiman Davis is an extremely pleasant 77 year old year old male patient here today for skin check. He notes brown scaly area on left leg. Noticed a few months ago. No pain or bleeding.  Patient has no other skin complaints today.  Remainder of the HPI, Meds, PMH, Allergies, FH, and SH was reviewed in chart.    Pertinent Hx:  History of BCC  Past Medical History:   Diagnosis Date     Basal cell carcinoma        History reviewed. No pertinent surgical history.     Family History   Problem Relation Age of Onset     Cancer Mother         skin cancer       Social History     Socioeconomic History     Marital status:      Spouse name: Not on file     Number of children: Not on file     Years of education: Not on file     Highest education level: Not on file   Occupational History     Not on file   Tobacco Use     Smoking status: Never Smoker     Smokeless tobacco: Never Used   Substance and Sexual Activity     Alcohol use: Not on file     Drug use: Not on file     Sexual activity: Not on file   Other Topics Concern     Not on file   Social History Narrative     Not on file     Social Determinants of Health     Financial Resource Strain: Not on file   Food Insecurity: Not on file   Transportation Needs: Not on file   Physical Activity: Not on file   Stress: Not on file   Social Connections: Not on file   Intimate Partner Violence: Not on file   Housing Stability: Not on file       Outpatient Encounter Medications as of 9/29/2022   Medication Sig Dispense Refill     aspirin 81 MG tablet Take 81 mg by mouth daily (Patient not taking: Reported on 9/29/2022)       calcipotriene (DOVONOX) 0.005 % SOLN solution Apply twice daily (Patient not taking: No sig reported) 120 mL 11     clobetasol (TEMOVATE) 0.05 % external solution Apply topically 2 times daily (Patient not taking: No sig reported) 100 mL 11     clobetasol propionate 0.05 % SHAM Externally apply topically daily as needed (Patient not taking: Reported on  9/29/2022)       clobetasol propionate 0.05 % SHAM Apply topically daily as needed Apply sparingly to dry scalp once every other day as needed.  Leave in place for 15 minutes then add water, lather and rinse thoroughly. (Patient not taking: No sig reported) 1 Bottle 1     ketoconazole (NIZORAL) 2 % shampoo Apply topically daily as needed for itching or irritation wsah scalp daily 240 mL 11     ketoconazole (NIZORAL) 2 % shampoo Apply to the affected area and wash off after 5 minutes. Use every other day. 120 mL 3     triamcinolone (KENALOG) 0.1 % cream Apply sparingly to affected area two to three times daily as needed (Patient not taking: No sig reported) 80 g 1     [DISCONTINUED] fluconazole (DIFLUCAN) 150 MG tablet Take 1 tablet (150 mg) by mouth every 3 days (Patient not taking: No sig reported) 9 tablet 0     No facility-administered encounter medications on file as of 9/29/2022.             O:   NAD, WDWN, Alert & Oriented, Mood & Affect wnl, Vitals stable   Here today alone   There were no vitals taken for this visit.   General appearance normal   Vitals stable   Alert, oriented and in no acute distress     Stuck on papules and brown macules on trunk and ext   Red papules on trunk  Brown papules and macules with regular pigment network and borders on torso and extremities  Gritty papule on left cheek x 1  psoriasiform plaque on left temporal scalp   The remainder of skin exam is normal       Eyes: Conjunctivae/lids:Normal     ENT: Lips: normal    MSK:Normal    Cardiovascular: peripheral edema none    Pulm: Breathing Normal    Neuro/Psych: Orientation:Alert and Orientedx3 ; Mood/Affect:normal     A/P:  1. Actinic keratosis x 1 on left cheek   LN2:  Treated with LN2 for 5s for 1-2 cycles. Warned risks of blistering, pain, pigment change, scarring, and incomplete resolution.  Advised patient to return if lesions do not completely resolve.  Wound care sheet given.  Return in one month if not resolved.   2.  Psoriasis   Refilled triamcinolone if needed.   3. Seborrheic keratosis, lentigo, angioma, benign nevi, History of BCC  BENIGN LESIONS DISCUSSED WITH PATIENT:  I discussed the specifics of tumor, prognosis, and genetics of benign lesions.  I explained that treatment of these lesions would be purely cosmetic and not medically neccessary.  I discussed with patient different removal options including excision, cautery and /or laser.       Nature and genetics of benign skin lesions dicussed with patient.  Signs and Symptoms of skin cancer discussed with patient.  ABCDEs of melanoma reviewed with patient.  Patient encouraged to perform monthly skin exams.  UV precautions reviewed with patient.  Risks of non-melanoma skin cancer discussed with patient   Return to clinic in one year or sooner if needed.

## 2023-06-03 ENCOUNTER — HEALTH MAINTENANCE LETTER (OUTPATIENT)
Age: 78
End: 2023-06-03

## 2023-07-05 ENCOUNTER — TRANSFERRED RECORDS (OUTPATIENT)
Dept: HEALTH INFORMATION MANAGEMENT | Facility: CLINIC | Age: 78
End: 2023-07-05

## 2023-07-11 ENCOUNTER — MEDICAL CORRESPONDENCE (OUTPATIENT)
Dept: HEALTH INFORMATION MANAGEMENT | Facility: CLINIC | Age: 78
End: 2023-07-11

## 2023-08-30 ENCOUNTER — TRANSCRIBE ORDERS (OUTPATIENT)
Dept: OTHER | Age: 78
End: 2023-08-30

## 2023-08-30 DIAGNOSIS — K65.4 MESENTERIC PANNICULITIS (H): Primary | ICD-10-CM

## 2023-08-31 ENCOUNTER — TELEPHONE (OUTPATIENT)
Dept: GASTROENTEROLOGY | Facility: CLINIC | Age: 78
End: 2023-08-31
Payer: COMMERCIAL

## 2023-08-31 NOTE — TELEPHONE ENCOUNTER
Health Call Center    Phone Message    May a detailed message be left on voicemail: yes     Reason for Call: Appointment Intake    Referring Provider Name: Yolanda Eaton, NP @ 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  This visit is being scheduled outside of the expected 1 month window for an urgent visit.  Pt refused first available at [place] or [place] as too far to drive. Requested in person visit at [facility]    Action Taken: Message routed to:  [facility] ([facility]): GI    Travel Screening: Not Applicable

## 2023-09-01 NOTE — TELEPHONE ENCOUNTER
LPN spoke with patient and assisted him with rescheduling appointment with Maricruz Sanchez MD to 9/27/23. Patient was grateful for the call and help getting him in sooner.    Calista Gao LPN

## 2023-09-12 ENCOUNTER — MYC MEDICAL ADVICE (OUTPATIENT)
Dept: GASTROENTEROLOGY | Facility: CLINIC | Age: 78
End: 2023-09-12
Payer: COMMERCIAL

## 2023-09-25 NOTE — TELEPHONE ENCOUNTER
Records Requested     September 25, 2023 11:50 AM  [id number]   Facility  [facility]  Fax: [phone]   Outcome Urgent requests sent to [facility] for imaging to be pushed to PACS.     Update 9/26/23 10:43 AM - Images received and resolved in PACS.        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]     FUTURE VISIT INFORMATION:  Appointment Date: 9/27/23  Appointment Time: 9:20 AM      NOTES STATUS DETAILS   OFFICE NOTE from Referring Provider Care Everywhere 7/11/23 - Yolanda Eaton NP - [facility]    OFFICE NOTE from Other Specialist Care Everywhere 2/16/17- Naeem Malcolm MD - [facility] - Gastric reflux    MEDICATION LIST [facility] Everywhere         COLONOSCOPY Care Everywhere 3/1/12, 4/13/07 - [facility]    PERTINENT LABS Care Everywhere    PATHOLOGY REPORTS (RELATED) Care Everywhere 4/13/07 - polyps    IMAGING (CT, MRI, EGD, MRCP, Small Bowel Follow Through/SBT, MR/CT Enterography) In PACS [facility]:   CT Abdomen Pelvis - 7/5/23  XR Abdomen - 2/28/23

## 2023-09-27 ENCOUNTER — OFFICE VISIT (OUTPATIENT)
Dept: GASTROENTEROLOGY | Facility: CLINIC | Age: 78
End: 2023-09-27
Payer: COMMERCIAL

## 2023-09-27 ENCOUNTER — LAB (OUTPATIENT)
Dept: LAB | Facility: CLINIC | Age: 78
End: 2023-09-27
Payer: COMMERCIAL

## 2023-09-27 ENCOUNTER — PRE VISIT (OUTPATIENT)
Dept: GASTROENTEROLOGY | Facility: CLINIC | Age: 78
End: 2023-09-27

## 2023-09-27 VITALS
DIASTOLIC BLOOD PRESSURE: 75 MMHG | HEIGHT: 72 IN | WEIGHT: 191 LBS | SYSTOLIC BLOOD PRESSURE: 143 MMHG | BODY MASS INDEX: 25.87 KG/M2 | HEART RATE: 71 BPM | OXYGEN SATURATION: 99 %

## 2023-09-27 DIAGNOSIS — K65.4 MESENTERIC PANNICULITIS (H): ICD-10-CM

## 2023-09-27 LAB
ALBUMIN SERPL BCG-MCNC: 4.3 G/DL (ref 3.5–5.2)
ALBUMIN UR-MCNC: NEGATIVE MG/DL
ALP SERPL-CCNC: 62 U/L (ref 40–129)
ALT SERPL W P-5'-P-CCNC: 19 U/L (ref 0–70)
ANION GAP SERPL CALCULATED.3IONS-SCNC: 8 MMOL/L (ref 7–15)
APPEARANCE UR: CLEAR
AST SERPL W P-5'-P-CCNC: 22 U/L (ref 0–45)
BILIRUB DIRECT SERPL-MCNC: <0.2 MG/DL (ref 0–0.3)
BILIRUB SERPL-MCNC: 0.3 MG/DL
BILIRUB UR QL STRIP: NEGATIVE
BUN SERPL-MCNC: 16.4 MG/DL (ref 8–23)
CALCIUM SERPL-MCNC: 9.7 MG/DL (ref 8.8–10.2)
CHLORIDE SERPL-SCNC: 102 MMOL/L (ref 98–107)
COLOR UR AUTO: YELLOW
CREAT SERPL-MCNC: 0.95 MG/DL (ref 0.67–1.17)
CRP SERPL-MCNC: <3 MG/L
DEPRECATED HCO3 PLAS-SCNC: 28 MMOL/L (ref 22–29)
EGFRCR SERPLBLD CKD-EPI 2021: 81 ML/MIN/1.73M2
ERYTHROCYTE [DISTWIDTH] IN BLOOD BY AUTOMATED COUNT: 12.3 % (ref 10–15)
ERYTHROCYTE [SEDIMENTATION RATE] IN BLOOD BY WESTERGREN METHOD: 10 MM/HR (ref 0–20)
FERRITIN SERPL-MCNC: 987 NG/ML (ref 31–409)
FOLATE SERPL-MCNC: 26.2 NG/ML (ref 4.6–34.8)
GLUCOSE SERPL-MCNC: 111 MG/DL (ref 70–99)
GLUCOSE UR STRIP-MCNC: NEGATIVE MG/DL
HCT VFR BLD AUTO: 42.8 % (ref 40–53)
HGB BLD-MCNC: 14.2 G/DL (ref 13.3–17.7)
HGB UR QL STRIP: NEGATIVE
IRON BINDING CAPACITY (ROCHE): 258 UG/DL (ref 240–430)
IRON SATN MFR SERPL: 24 % (ref 15–46)
IRON SERPL-MCNC: 63 UG/DL (ref 61–157)
KETONES UR STRIP-MCNC: NEGATIVE MG/DL
LEUKOCYTE ESTERASE UR QL STRIP: NEGATIVE
MCH RBC QN AUTO: 30.1 PG (ref 26.5–33)
MCHC RBC AUTO-ENTMCNC: 33.2 G/DL (ref 31.5–36.5)
MCV RBC AUTO: 91 FL (ref 78–100)
NITRATE UR QL: NEGATIVE
PH UR STRIP: 5.5 [PH] (ref 5–7)
PLATELET # BLD AUTO: 176 10E3/UL (ref 150–450)
POTASSIUM SERPL-SCNC: 4 MMOL/L (ref 3.4–5.3)
PROT SERPL-MCNC: 6.9 G/DL (ref 6.4–8.3)
RBC # BLD AUTO: 4.72 10E6/UL (ref 4.4–5.9)
RBC #/AREA URNS AUTO: NORMAL /HPF
SODIUM SERPL-SCNC: 138 MMOL/L (ref 135–145)
SP GR UR STRIP: 1.02 (ref 1–1.03)
UROBILINOGEN UR STRIP-ACNC: 0.2 E.U./DL
WBC # BLD AUTO: 5.8 10E3/UL (ref 4–11)
WBC #/AREA URNS AUTO: NORMAL /HPF

## 2023-09-27 PROCEDURE — 86140 C-REACTIVE PROTEIN: CPT

## 2023-09-27 PROCEDURE — 82746 ASSAY OF FOLIC ACID SERUM: CPT

## 2023-09-27 PROCEDURE — 85027 COMPLETE CBC AUTOMATED: CPT

## 2023-09-27 PROCEDURE — 82607 VITAMIN B-12: CPT

## 2023-09-27 PROCEDURE — 83550 IRON BINDING TEST: CPT

## 2023-09-27 PROCEDURE — 82728 ASSAY OF FERRITIN: CPT

## 2023-09-27 PROCEDURE — 99204 OFFICE O/P NEW MOD 45 MIN: CPT | Performed by: INTERNAL MEDICINE

## 2023-09-27 PROCEDURE — 85652 RBC SED RATE AUTOMATED: CPT

## 2023-09-27 PROCEDURE — 81001 URINALYSIS AUTO W/SCOPE: CPT

## 2023-09-27 PROCEDURE — 36415 COLL VENOUS BLD VENIPUNCTURE: CPT

## 2023-09-27 PROCEDURE — 82248 BILIRUBIN DIRECT: CPT

## 2023-09-27 PROCEDURE — 80053 COMPREHEN METABOLIC PANEL: CPT

## 2023-09-27 PROCEDURE — 83540 ASSAY OF IRON: CPT

## 2023-09-27 ASSESSMENT — ENCOUNTER SYMPTOMS
DIZZINESS: 1
VOMITING: 0
NAUSEA: 0
DIARRHEA: 0
FEVER: 0
WEIGHT LOSS: 0
DIAPHORESIS: 0
ABDOMINAL PAIN: 1
BLOOD IN STOOL: 0
HEARTBURN: 1
PSYCHIATRIC NEGATIVE: 1
RESPIRATORY NEGATIVE: 1
MUSCULOSKELETAL NEGATIVE: 1
SHORTNESS OF BREATH: 0
CONSTIPATION: 0
CARDIOVASCULAR NEGATIVE: 1
EYES NEGATIVE: 1

## 2023-09-27 ASSESSMENT — PAIN SCALES - GENERAL: PAINLEVEL: NO PAIN (1)

## 2023-09-27 NOTE — PROGRESS NOTES
GASTROENTEROLOGY NEW PATIENT    CC/REFERRING MD:    Naeem Malcolm    REASON FOR CONSULTATION:   Yolanda Eaton for   Chief Complaint   Patient presents with    New Patient       HISTORY OF PRESENT ILLNESS:    Suleiman Davis is a 78 year old male who is being evaluated in GI clinic today.    Feels shoevling snow triggered it severe abdo pain sometime around 2023. Pain was severe. Continued, 2023 -- CT scan - mesenteric panniculitis noted    Has been using turmeric, diclofenac cream(local), and boswellia in order to reduce the inflammation and pain  Cutting back on boswellia, is able to sleep on both sides - markers to him for improvement    Med hx/  S/p prostatectomy   Colonoscopy - last one maybe 6 yrs ago - was told does not need any more  Kidney stones in age 30s removed with basket device  Appendectomy age 12  Dx'd with gastritis in ER 1974  3/21/23 Status post ultrasound-guided fine-needle aspiration of hypoechoic area in the right submandibular gland.   3/21/23  Status post ultrasound-guided fine-needle aspiration of nodule superior aspect of the left lobe of the thyroid.     Social/  [place] of wine 4 nights a week   from [facility]   53 yrs  6 kids - 4 biologic  [place]  His father lived [place]    Fam hx/  No CRC no colon polyps  No celiac  No IBD  States his F and sis both had malignant masses in the intestinal area  F - hx of alcoholism  M  lung cancer, did not smoke, lived in [place]      I have reviewed and updated the patient's Past Medical History, Social History, Family History and Medication List.    PERTINENT PAST MEDICAL HISTORY:  (I personally reviewed this history with the patient at today's visit)   Past Medical History:   Diagnosis Date    Basal cell carcinoma          PREVIOUS SURGERIES: (I personally reviewed this history with the patient at today's visit)   No past surgical history on file.    ALLERGIES:     Allergies    Allergen Reactions    Meperidine Other (See Comments)     Other reaction(s): Other - Describe In Comment Field  Comment: GI problems, Description:   Comment: GI problems, Description:     Penicillins Rash     Other reaction(s): Other - Describe In Comment Field  Comment: Rash, Description:        PERTINENT MEDICATIONS:  No current outpatient medications on file.    SOCIAL HISTORY:  Social History     Occupational History    Not on file   Tobacco Use    Smoking status: Never    Smokeless tobacco: Never   Substance and Sexual Activity    Alcohol use: Not on file    Drug use: Not on file    Sexual activity: Not on file     Smoked a pipe in his 20s      FAMILY HISTORY:   Family History   Problem Relation Age of Onset    Cancer Mother         skin cancer        Review of Systems  Review of Systems   Constitutional:  Negative for diaphoresis, fever and weight loss.        7 lb weight gain since 3/2023 due to lack of activity due to pain   HENT: Negative.     Eyes: Negative.    Respiratory: Negative.  Negative for shortness of breath.    Cardiovascular: Negative.  Negative for chest pain.   Gastrointestinal:  Positive for abdominal pain and heartburn. Negative for blood in stool, constipation, diarrhea, melena, nausea and vomiting.        2 week ago noted bile in his mouth wile lying down which is unusual  Takes bran with cereal in the morning, has a regular bowel habit, healthy diet   Genitourinary: Negative.    Musculoskeletal: Negative.    Skin:  Negative for rash.   Neurological:  Positive for dizziness.        Vertigo if turns around too quickly   Endo/Heme/Allergies: Negative.    Psychiatric/Behavioral: Negative.          Exam:    BP (!) 143/75   Pulse 71   Ht 1.829 m (6')   Wt 86.6 kg (191 lb)   SpO2 99%   BMI 25.90 kg/m    Physical Exam  Constitutional:       Appearance: Normal appearance.   HENT:      Head: Normocephalic and atraumatic.      Nose: Nose normal. No congestion.      Mouth/Throat:      Mouth:  Mucous membranes are moist.      Pharynx: Oropharynx is clear. No posterior oropharyngeal erythema.   Eyes:      General: No scleral icterus.     Extraocular Movements: Extraocular movements intact.      Conjunctiva/sclera: Conjunctivae normal.      Pupils: Pupils are equal, round, and reactive to light.   Cardiovascular:      Rate and Rhythm: Normal rate and regular rhythm.      Pulses: Normal pulses.      Heart sounds: Normal heart sounds. No murmur heard.     No friction rub. No gallop.   Pulmonary:      Effort: Pulmonary effort is normal. No respiratory distress.      Breath sounds: No wheezing, rhonchi or rales.   Abdominal:      General: Abdomen is flat. Bowel sounds are normal. There is no distension.      Palpations: Abdomen is soft. There is no mass.      Tenderness: There is abdominal tenderness. There is no guarding.      Comments: +ttp LLQ - mild   Musculoskeletal:         General: Normal range of motion.      Cervical back: Normal range of motion and neck supple.      Right lower leg: No edema.      Left lower leg: No edema.   Lymphadenopathy:      Cervical: No cervical adenopathy.   Skin:     General: Skin is warm and dry.      Findings: No rash.   Neurological:      General: No focal deficit present.      Mental Status: He is alert and oriented to person, place, and time.   Psychiatric:         Mood and Affect: Mood normal.         Behavior: Behavior normal.         Thought Content: Thought content normal.         Judgment: Judgment normal.          PERTINENT STUDIES have been reviewed.  3/21/23  A) THYROID, LEFT, ULTRASOUND GUIDED FINE NEEDLE ASPIRATION:   1. Favor benign colloid nodule (limited cellularity)   2. Negative for malignancy    3. See comment     B) SUBMANDIBULAR GLAND, RIGHT, ULTRASOUND-GUIDED FINE NEEDLE ASPIRATION:   1. Non-diagnostic due to insufficient viable epithelium   2. See comment     7/5/2023 CT C/A/P without contrast  1.  Mild stranding in the fat around the root of the small  bowel mesentery consistent with mesenteric panniculitis.   2.  Colonic diverticulosis most pronounced in the sigmoid colon. No CT evidence for acute diverticulitis or abscess. No bowel obstruction or free intraperitoneal air.   3.  Mildly complex right renal cysts, which appear benign and do not need additional follow-up.   4.  Mild diffuse fatty infiltration of the liver.   5.  Small hiatal hernia.   6.  Prostatectomy.       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  We discussed that the main symptoms of MP are nausea, vomiting, abdominal pain and less often weight loss, fever, change on bowel habit ; we discussed the natural progression in that often there is spontaneous resolution however in some cases there is progression to sclerosis which can lead to bowel obstructions or impingement on local vasculature.  We discussed that often there is spontaneous resolution of mesenteric panniculitis on clinical follow up in 3-6 months and in this case we have the benefit of time and his symptoms have indeed improved over time.  We discussed that often mesenteric panniculitis is an incidental finding and deciding that it is contributing to the clinical picture is partially completed by the process of exclusion of other items on the ddx.  We discussed that in his case, topical diclofenac did help the pain so perhaps his symptom of pain is unlikely to be due to mesenteric panniculitis, which is located at the root of the small bowel mesentery.     We discussed that etiologies for MP, if not idiopathic, include a broad ddx of: hx abdo surgery/trauma, gallstones, abdo/pelvic malignancy, vascular disease, infection, autoimmune process, DM, HTN.   -- Ensure age appropriate cancer screenings have been completed.  Patient is s/p  prostatectomy, recent colonoscopy.  We will obtain colonoscopy report for review.  -- labs and UA today, which may direct further evaluative studies  -- repeat CT scan, however this time order with IV contrast.  We discussed that he is at a 3 month follow up right now and he is improving - plan to either complete this study this month or in a month.  -- if imaging study is equivocal other options for further evaluation include PET CT, ex lap with biopsy.      Maricruz Sanchez MD    RTC 3 months    Thank you for this consultation.  It was a pleasure to participate in the care of this patient; please contact us with any further questions.        Addendum:  CT abdo with contrast completed at outside facility. Colonoscopy is not recent.  See MyChart discussion with patient. EGD/colonoscopy recommended and the patient defers for now.  Patient's abdominal pain has substantially improved.  Ferritin is elevated an patient advised to hold etoh and recheck. At future appt plan will be: reconsider egd/colonoscopy, consider follow up imaging with different modality such as MRI abdomen, PET CT /consider discuss with Heme/Onc

## 2023-09-27 NOTE — NURSING NOTE
Chief Complaint   Patient presents with    New Patient       Vitals:    09/27/23 0857   BP: (!) 143/75   Pulse: 71   SpO2: 99%   Weight: 86.6 kg (191 lb)   Height: 1.829 m (6')       Body mass index is 25.9 kg/m .    Blood pressure high. Provider notified.     Margo Hooks

## 2023-09-27 NOTE — TELEPHONE ENCOUNTER
----- Message from Maricruz Sanchez MD sent at 9/27/2023 10:57 AM CDT -----  Regarding: phone number  Hi this patient has mesenteric panniculitis that is improving .  I would like for him to call us with any new or worsening symptoms.  He knows it is likely we will recommend ER if symptoms severe.  Can you send him your phone number, as I did not have your card with me this morning. Maricruz

## 2023-09-27 NOTE — LETTER
2023         RE: Suleiman Davis  [address] [facility] [address]        Dear Colleague,    Thank you for referring your patient, Suleiman Davis, to the [facility] GASTROENTEROLOGY CLINIC [place]. Please see a copy of my visit note below.    GASTROENTEROLOGY NEW PATIENT    CC/REFERRING MD:    Naeem Malcolm    REASON FOR CONSULTATION:   Yolanda Eaton for   Chief Complaint   Patient presents with    New Patient       HISTORY OF PRESENT ILLNESS:    Suleiman Davis is a 78 year old male who is being evaluated in GI clinic today.    Feels shoevling snow triggered it severe abdo pain sometime around 2023. Pain was severe. Continued, 2023 -- CT scan - mesenteric panniculitis noted    Has been using turmeric, diclofenac cream(local), and boswellia in order to reduce the inflammation and pain  Cutting back on boswellia, is able to sleep on both sides - markers to him for improvement    Med hx/  S/p prostatectomy   Colonoscopy - last one maybe 6 yrs ago - was told does not need any more  Kidney stones in age 30s removed with basket device  Appendectomy age 12  Dx'd with gastritis in ER 1974  3/21/23 Status post ultrasound-guided fine-needle aspiration of hypoechoic area in the right submandibular gland.   3/21/23  Status post ultrasound-guided fine-needle aspiration of nodule superior aspect of the left lobe of the thyroid.     Social/  Glass of wine 4 nights a week   from [facility]   53 yrs  6 kids - 4 biologic  [place]  His father lived on the [facility]    Fam hx/  No CRC no colon polyps  No celiac  No IBD  States his F and sis both had malignant masses in the intestinal area  F - hx of alcoholism  M  lung cancer, did not smoke, lived in [place]      I have reviewed and updated the patient's Past Medical History, Social History, Family History and Medication List.    PERTINENT PAST MEDICAL HISTORY:  (I personally reviewed this history with  the patient at today's visit)   Past Medical History:   Diagnosis Date    Basal cell carcinoma          PREVIOUS SURGERIES: (I personally reviewed this history with the patient at today's visit)   No past surgical history on file.    ALLERGIES:     Allergies   Allergen Reactions    Meperidine Other (See Comments)     Other reaction(s): Other - Describe In Comment Field  Comment: GI problems, Description:   Comment: GI problems, Description:     Penicillins Rash     Other reaction(s): Other - Describe In Comment Field  Comment: Rash, Description:        PERTINENT MEDICATIONS:  No current outpatient medications on file.    SOCIAL HISTORY:  Social History     Occupational History    Not on file   Tobacco Use    Smoking status: Never    Smokeless tobacco: Never   Substance and Sexual Activity    Alcohol use: Not on file    Drug use: Not on file    Sexual activity: Not on file     Smoked a pipe in his 20s      FAMILY HISTORY:   Family History   Problem Relation Age of Onset    Cancer Mother         skin cancer        Review of Systems  Review of Systems   Constitutional:  Negative for diaphoresis, fever and weight loss.        7 lb weight gain since 3/2023 due to lack of activity due to pain   HENT: Negative.     Eyes: Negative.    Respiratory: Negative.  Negative for shortness of breath.    Cardiovascular: Negative.  Negative for chest pain.   Gastrointestinal:  Positive for abdominal pain and heartburn. Negative for blood in stool, constipation, diarrhea, melena, nausea and vomiting.        2 week ago noted bile in his mouth wile lying down which is unusual  Takes bran with cereal in the morning, has a regular bowel habit, healthy diet   Genitourinary: Negative.    Musculoskeletal: Negative.    Skin:  Negative for rash.   Neurological:  Positive for dizziness.        Vertigo if turns around too quickly   Endo/Heme/Allergies: Negative.    Psychiatric/Behavioral: Negative.          Exam:    BP (!) 143/75   Pulse 71    Ht 1.829 m (6')   Wt 86.6 kg (191 lb)   SpO2 99%   BMI 25.90 kg/m    Physical Exam  Constitutional:       Appearance: Normal appearance.   HENT:      Head: Normocephalic and atraumatic.      Nose: Nose normal. No congestion.      Mouth/Throat:      Mouth: Mucous membranes are moist.      Pharynx: Oropharynx is clear. No posterior oropharyngeal erythema.   Eyes:      General: No scleral icterus.     Extraocular Movements: Extraocular movements intact.      Conjunctiva/sclera: Conjunctivae normal.      Pupils: Pupils are equal, round, and reactive to light.   Cardiovascular:      Rate and Rhythm: Normal rate and regular rhythm.      Pulses: Normal pulses.      Heart sounds: Normal heart sounds. No murmur heard.     No friction rub. No gallop.   Pulmonary:      Effort: Pulmonary effort is normal. No respiratory distress.      Breath sounds: No wheezing, rhonchi or rales.   Abdominal:      General: Abdomen is flat. Bowel sounds are normal. There is no distension.      Palpations: Abdomen is soft. There is no mass.      Tenderness: There is abdominal tenderness. There is no guarding.      Comments: +ttp LLQ - mild   Musculoskeletal:         General: Normal range of motion.      Cervical back: Normal range of motion and neck supple.      Right lower leg: No edema.      Left lower leg: No edema.   Lymphadenopathy:      Cervical: No cervical adenopathy.   Skin:     General: Skin is warm and dry.      Findings: No rash.   Neurological:      General: No focal deficit present.      Mental Status: He is alert and oriented to person, place, and time.   Psychiatric:         Mood and Affect: Mood normal.         Behavior: Behavior normal.         Thought Content: Thought content normal.         Judgment: Judgment normal.          PERTINENT STUDIES have been reviewed.  3/21/23  A) THYROID, LEFT, ULTRASOUND GUIDED FINE NEEDLE ASPIRATION:   1. Favor benign colloid nodule (limited cellularity)   2. Negative for malignancy    3. See  comment     B) SUBMANDIBULAR GLAND, RIGHT, ULTRASOUND-GUIDED FINE NEEDLE ASPIRATION:   1. Non-diagnostic due to insufficient viable epithelium   2. See comment     7/5/2023 CT C/A/P without contrast  1.  Mild stranding in the fat around the root of the small bowel mesentery consistent with mesenteric panniculitis.   2.  Colonic diverticulosis most pronounced in the sigmoid colon. No CT evidence for acute diverticulitis or abscess. No bowel obstruction or free intraperitoneal air.   3.  Mildly complex right renal cysts, which appear benign and do not need additional follow-up.   4.  Mild diffuse fatty infiltration of the liver.   5.  Small hiatal hernia.   6.  Prostatectomy.       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  We discussed that the main symptoms of MP are nausea, vomiting, abdominal pain and less often weight loss, fever, change on bowel habit ; we discussed the natural progression in that often there is spontaneous resolution however in some cases there is progression to sclerosis which can lead to bowel obstructions or impingement on local vasculature.  We discussed that often there is spontaneous resolution of mesenteric panniculitis on clinical follow up in 3-6 months and in this case we have the benefit of time and his symptoms have indeed improved over time.  We discussed that often mesenteric panniculitis is an incidental finding and deciding that it is contributing to the clinical picture is partially completed by the process of exclusion of other items on the ddx.  We discussed that in his case, topical diclofenac did help the pain so perhaps his symptom of pain is unlikely to be due to mesenteric panniculitis, which is located at the root of the small bowel mesentery.     We discussed that  etiologies for MP, if not idiopathic, include a broad ddx of: hx abdo surgery/trauma, gallstones, abdo/pelvic malignancy, vascular disease, infection, autoimmune process, DM, HTN.   -- Ensure age appropriate cancer screenings have been completed.  Patient is s/p prostatectomy, recent colonoscopy.  We will obtain colonoscopy report for review.  -- labs and UA today, which may direct further evaluative studies  -- repeat CT scan, however this time order with IV contrast.  We discussed that he is at a 3 month follow up right now and he is improving - plan to either complete this study this month or in a month.  -- if imaging study is equivocal other options for further evaluation include PET CT, ex lap with biopsy.    RTC 3 months    Thank you for this consultation.  It was a pleasure to participate in the care of this patient; please contact us with any further questions.            Again, thank you for allowing me to participate in the care of your patient.      Sincerely,    Maricruz Sanchez MD

## 2023-09-28 LAB — VIT B12 SERPL-MCNC: 745 PG/ML (ref 232–1245)

## 2023-10-06 NOTE — RESULT ENCOUNTER NOTE
Labs: ferritin elevated in context of normal iron sat and also of note unremarkable ESR and CRP.  Folate is robust.  Plan: as patient is still experiencing abdominal pain, CT abdo/pelvis with contrast; afterwards consider other items on ddx for elevated ferritin.

## 2023-10-09 ENCOUNTER — DOCUMENTATION ONLY (OUTPATIENT)
Dept: GASTROENTEROLOGY | Facility: CLINIC | Age: 78
End: 2023-10-09
Payer: COMMERCIAL

## 2023-10-09 ENCOUNTER — TELEPHONE (OUTPATIENT)
Dept: GASTROENTEROLOGY | Facility: CLINIC | Age: 78
End: 2023-10-09
Payer: COMMERCIAL

## 2023-10-09 DIAGNOSIS — K65.4 MESENTERIC PANNICULITIS (H): Primary | ICD-10-CM

## 2023-10-09 NOTE — TELEPHONE ENCOUNTER
----- Message from Yael Garcia RN sent at 10/9/2023  9:33 AM CDT -----  Regarding: RE: follow up  Hello,   Can you please fax the CT order to [facility] in [facility]. Can you also obtain his last colonoscopy record from [facility].   Thank you,   Agata  ----- Message -----  From: Maricruz Sanchez MD  Sent: 10/6/2023  11:28 AM CDT  To: Yael Garcia RN  Subject: follow up                                        Hi Agata,    I agree with you regarding your last note on his [employer] message - next step is CT abdo/pelvis with contrast -- can you order.  I wrote the patient a message.  The CT scan can be completed locally.    Also I cannot remember if I asked for his last colonoscopy report for review.    Thank you  Maricruz

## 2023-10-09 NOTE — TELEPHONE ENCOUNTER
----- Message from Maricruz Sanchez MD sent at 10/6/2023 11:15 AM CDT -----  Regarding: follow up  Hi Agata,    I agree with you regarding your last note on his [employer] message - next step is CT abdo/pelvis with contrast -- can you order.  I wrote the patient a message.  The CT scan can be completed locally.    Also I cannot remember if I asked for his last colonoscopy report for review.    Thank you  Maricruz

## 2023-10-09 NOTE — PROGRESS NOTES
Called to request copies of colonoscopy report. [facility] Representative noted that if records were older than 10 years then they would not be able to send anything through; retention for facility is limited to past 10 years.     [facility] reports colonoscopy from 3-1-12 and 4-13-07. Patient claims they may have had one within the past six years.     Faxed request per clinic protocol. Will follow-up as needed. Relayed information to RN[facility].    Records Requested:  Colonoscopy report    Facility Information:  [address] [address]  Phone #: [phone]  [facility] Fax #: [phone]      SK

## 2023-10-09 NOTE — TELEPHONE ENCOUNTER
Order placed for CT. Requested support pool fax to [facility] and obtain colonoscopy records from [facility].

## 2023-10-11 ENCOUNTER — TELEPHONE (OUTPATIENT)
Dept: GASTROENTEROLOGY | Facility: CLINIC | Age: 78
End: 2023-10-11
Payer: COMMERCIAL

## 2023-10-11 NOTE — PROGRESS NOTES
Called Patient to inquire about where colonoscopies were completed.    Patient stated that they only had two colonoscopies completed and both were done at the [facility].    Will relay information to Dr. Sanchez.      SK

## 2023-10-11 NOTE — TELEPHONE ENCOUNTER
Spoke with patient and scheduled the follow-up order per Dr. Sanchez. Patient is scheduled for an in person visit with [facility], Jossie De La Cruz on 2/2/24.

## 2023-10-11 NOTE — PROGRESS NOTES
Called to follow-up on request for records.     [facility] Representative reported that they did not have any colonoscopy reports to send from the past 10 years.     Will relay information to Dr. Sanchez.     Records Requested:  Colonoscopy report     Facility Information:  [address] [address]  Phone #: [phone]  [facility] Fax #: [phone]        SK

## 2023-10-23 ENCOUNTER — TRANSFERRED RECORDS (OUTPATIENT)
Dept: HEALTH INFORMATION MANAGEMENT | Facility: CLINIC | Age: 78
End: 2023-10-23
Payer: COMMERCIAL

## 2023-11-13 ENCOUNTER — MYC MEDICAL ADVICE (OUTPATIENT)
Dept: GASTROENTEROLOGY | Facility: CLINIC | Age: 78
End: 2023-11-13
Payer: COMMERCIAL

## 2023-11-13 NOTE — TELEPHONE ENCOUNTER
Dr. Sanchez,  Please review CT scan done on 10/23/23. Can be found in PACS and Media.    Pt is asking for your recommendation and interpretation.    Thank you.  Sameera

## 2023-11-13 NOTE — TELEPHONE ENCOUNTER
Hello Team,  Please obtain CT scan done 10/23/23 at [facility]. We have the fax report. Can we get  a copy of the scan?    Facility Information:  [address] [address]  Phone #: [phone]  [facility] Fax #: [phone]    Thank you.  Sameera

## 2023-12-15 ENCOUNTER — TELEPHONE (OUTPATIENT)
Dept: GASTROENTEROLOGY | Facility: CLINIC | Age: 78
End: 2023-12-15
Payer: COMMERCIAL

## 2023-12-15 NOTE — TELEPHONE ENCOUNTER
LVM and sent MyC regarding the following:     Appointment on 02/02/2024 with Jossie De La Cruz PA-C will need to be rescheduled due to provider unavailability that day. Pt was recommended to follow-up with [facility] so can reschedule with them and not Jossie MÁRQUEZ    Left K Pod number

## 2024-03-06 ENCOUNTER — OFFICE VISIT (OUTPATIENT)
Dept: GASTROENTEROLOGY | Facility: CLINIC | Age: 79
End: 2024-03-06
Attending: INTERNAL MEDICINE
Payer: COMMERCIAL

## 2024-03-06 VITALS
HEIGHT: 72 IN | WEIGHT: 187.5 LBS | BODY MASS INDEX: 25.4 KG/M2 | SYSTOLIC BLOOD PRESSURE: 161 MMHG | DIASTOLIC BLOOD PRESSURE: 83 MMHG | HEART RATE: 64 BPM

## 2024-03-06 DIAGNOSIS — K65.4 MESENTERIC PANNICULITIS (H): Primary | ICD-10-CM

## 2024-03-06 DIAGNOSIS — R79.9 ABNORMAL FINDING OF BLOOD CHEMISTRY, UNSPECIFIED: ICD-10-CM

## 2024-03-06 PROCEDURE — 99215 OFFICE O/P EST HI 40 MIN: CPT | Performed by: PHYSICIAN ASSISTANT

## 2024-03-06 ASSESSMENT — PAIN SCALES - GENERAL: PAINLEVEL: NO PAIN (0)

## 2024-03-06 NOTE — LETTER
"    3/6/2024         RE: Suleiman Davis  [address] [place] [id number]        Dear Colleague,    Thank you for referring your patient, Suleiman Davis, to the [facility] GASTROENTEROLOGY CLINIC [place]. Please see a copy of my visit note below.      GI CLINIC VISIT    HPI: 79 year old male with PMH of prostate ca s/p prostatectomy  presenting to GI clinic for abd pain. Last seen with Dr AMNA Sanchez 9/2023 - today is my first meet with the patient.     Dr Sanchez note   Feels shoevling snow triggered it severe abdo pain sometime around March 2023. Pain was severe. Continued, July 5 2023 -- CT scan - mesenteric panniculitis noted     Has been using turmeric, diclofenac cream(local), and boswellia in order to reduce the inflammation and pain  Cutting back on boswellia, is able to sleep on both sides - markers to him for improvement     Med hx/  S/p prostatectomy 2011  Colonoscopy - last one maybe 6 yrs ago - was told does not need any more  Kidney stones in age 30s removed with basket device  Appendectomy age 12  Dx'd with gastritis in ER 1974  3/21/23 Status post ultrasound-guided fine-needle aspiration of hypoechoic area in the right submandibular gland.   3/21/23  Status post ultrasound-guided fine-needle aspiration of nodule superior aspect of the left lobe of the thyroid.      Today 3/6/24    1. Shares his sx started march 2023 after shoveling snow. Severe pain which continued for months. As of lately, he has not had any abdominal pain, discomfort or \"twinges\" any further. It went away slowly and starting in Fall 2023, he was able to start his stretches again. Today he is back to baseline functional health without limitations.   -when he had pain, he was taking topical diclofenac which helped  -stopped tumeric and supplement in jan 2024     2. There is a question about his increased ferritin in setting of normal iron levels and liver enzymes. He stopped drinking ETOH in Dec 2023 and is watching his diet (avoid " "kayla, for example). His weight at home is around 180-183#.     3. GERD -  Had about 8x episodes of heartburn since his last appt with our team which is not normal for him. They were short lived. No dysphagia, odynphagia, melena.     4. Stooling - regular, once daily and formed. No blood or black. Adds bran flakes in diet which helps.   -sporadic leakage of stools which is not new (ongoing for years) and he attributes to intake of certain dietary triggers  -shares he can use the toilet, feel \"sticky\" back there and when he wipes again, sees stools on the TP. This does not concern him and he is not interested in eval.     5. He is retiring soon as his wife is needing more hands on assistance for her daily activites. She appears to have a neurocognitive disorder. He prefers to avoid any invasive procedures.     6. No other questions or concerns     ROS: 10pt ROS performed and otherwise negative.    PAST MEDICAL HISTORY:  Past Medical History:   Diagnosis Date    Basal cell carcinoma        PREVIOUS ABDOMINAL/GYNECOLOGIC SURGERIES:    No past surgical history on file.      PERTINENT MEDICATIONS:  No current outpatient medications on file.     SOCIAL HISTORY:    Social History     Socioeconomic History    Marital status:      Spouse name: Not on file    Number of children: Not on file    Years of education: Not on file    Highest education level: Not on file   Occupational History    Not on file   Tobacco Use    Smoking status: Never    Smokeless tobacco: Never   Substance and Sexual Activity    Alcohol use: Not on file    Drug use: Not on file    Sexual activity: Not on file   Other Topics Concern    Not on file   Social History Narrative    Not on file     Social Determinants of Health     Financial Resource Strain: Not on file   Food Insecurity: Not on file   Transportation Needs: Not on file   Physical Activity: Not on file   Stress: Not on file   Social Connections: Not on file   Interpersonal Safety: Not on " file   Housing Stability: Not on file       FAMILY HISTORY:    Family History   Problem Relation Age of Onset    Cancer Mother         skin cancer       PHYSICAL EXAMINATION:  Vitals reviewed: BP (!) 161/83 (BP Location: Right arm, Patient Position: Sitting, Cuff Size: Adult Regular)   Pulse 64   Ht 1.829 m (6')   Wt 85 kg (187 lb 8 oz)   BMI 25.43 kg/m    Wt:   Wt Readings from Last 2 Encounters:   09/27/23 86.6 kg (191 lb)      Constitutional: aaox3, cooperative, pleasant, not dyspneic/diaphoretic, no acute distress  Eyes: Sclera anicteric/injected  Ears/nose/mouth/throat: hearing intact  Neck: supple, active ROM w/o limitation or pain   CV: No edema  Respiratory: Unlabored breathing  Abd:  Nondistended, +bs, no hepatosplenomegaly, nontender, no peritoneal signs, neg Castlilo's sign   Skin: warm, perfused, no jaundice  Psych: Normal affect  MSK: Normal gait     PERTINENT STUDIES - Reviewed in EMR     Lab Results   Component Value Date    WBC 5.8 09/27/2023    HGB 14.2 09/27/2023     09/27/2023    ALT 19 09/27/2023    AST 22 09/27/2023     09/27/2023    BUN 16.4 09/27/2023    CO2 28 09/27/2023        Liver Function Studies -   Recent Labs   Lab Test 09/27/23  1602   PROTTOTAL 6.9   ALBUMIN 4.3   BILITOTAL 0.3   ALKPHOS 62   AST 22   ALT 19      Impression    1.  No acute abnormality.  2.  Stable genevieve mesentery.  Narrative    For Patients: As a result of the 21st Century Cures Act, medical imaging exams and procedure reports are released immediately into your electronic medical record. You may view this report before your referring provider. If you have questions, please contact your health care provider.    EXAM: CT ABDOMEN PELVIS W  LOCATION: [facility]  DATE: 10/23/2023    INDICATION: Mesenteric Panniculitis (hc), pain.  COMPARISON: 07/05/2023  TECHNIQUE: CT scan of the abdomen and pelvis was performed following injection of IV contrast. Multiplanar reformats were obtained.  Dose reduction techniques were used.  CONTRAST: 100 mL Omnipaque 350    FINDINGS:  LOWER CHEST: Mild bibasilar atelectasis or scar. Patulous esophagus.    HEPATOBILIARY: Normal.    PANCREAS: Normal.    SPLEEN: Normal.    ADRENAL GLANDS: Normal.    KIDNEYS/BLADDER: Stable simple and benign renal cysts, no follow-up required. No hydronephrosis. Stable mild circumferential bladder wall thickening which is consistent with chronic outlet obstruction.    BOWEL: Diverticulosis of the colon. No acute inflammatory change. No obstruction. Appendix not visualized.    LYMPH NODES: Stable mild genevieve mesentery. No lymphadenopathy by size criterion. A stable gastrohepatic ligament node which is subcentimeter in short axis and nonpathologic by size criterion.    VASCULATURE: No abdominal aortic aneurysm.    PELVIC ORGANS: Prostate not visualized, likely removed.    MUSCULOSKELETAL: Degenerative changes. Osseous demineralization.    PREVIOUS ENDOSCOPY    No results found for this or any previous visit.    ASSESSMENT/PLAN:  79 year old male with PMH of prostate Ca s/p prostatectomy  presenting to GI clinic for abd pain follow-up.     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 He had a repeat equivocal CTAP done 10/2023 with our team with labs pertinent for nonspecific elevation in ferritin (normal iron panel and liver enzymes).     -Today he is back to baseline health with no further episodes of this pain   -Will plan to discuss case with team on repeat imaging modality to follow CTAP findings - MR?  -repeat ferritin and check hepatic function panel. Ferritin elevation is nonspecific and he is overall asx. CBC WNL.     #heartburn  Intermittent w/o alarm sx  -OK to take pepcid PRN     #stooling with occ leakage  Ongoing and not bothersome to patient. He is wishing to avoid invasive work up if at all possible  -advised he start fiber per AVS in form of  betsey seeds   -ensure good hydration     #CRC screening  Reportedly done within last 6 years and told no longer needing screening though I do not see these records. Last scope in chart is from 2012.   -per notes from my clinical team 10/2023, [facility] contacted and did not have CScopes for this patient in last 10 years   -will need to touch base with him - done elsewhere?     RTC 3-6 mo or PRN         Again, thank you for allowing me to participate in the care of your patient.      Sincerely,    Calista Rodriguez PA-C

## 2024-03-06 NOTE — NURSING NOTE
Chief Complaint   Patient presents with    Follow Up       Vitals:    03/06/24 0948   BP: (!) 161/83   BP Location: Right arm   Patient Position: Sitting   Cuff Size: Adult Regular   Pulse: 64   Weight: 85 kg (187 lb 8 oz)   Height: 1.829 m (6')       Body mass index is 25.43 kg/m .      Kasey Machado LPN                  \

## 2024-03-06 NOTE — PROGRESS NOTES
"  GI CLINIC VISIT    HPI: 79 year old male with PMH of prostate ca s/p prostatectomy  presenting to GI clinic for abd pain. Last seen with Dr AMNA Sanchez 9/2023 - today is my first meet with the patient.     Dr Sanchez note   Feels shoevling snow triggered it severe abdo pain sometime around March 2023. Pain was severe. Continued, July 5 2023 -- CT scan - mesenteric panniculitis noted     Has been using turmeric, diclofenac cream(local), and boswellia in order to reduce the inflammation and pain  Cutting back on boswellia, is able to sleep on both sides - markers to him for improvement     Med hx/  S/p prostatectomy 2011  Colonoscopy - last one maybe 6 yrs ago - was told does not need any more  Kidney stones in age 30s removed with basket device  Appendectomy age 12  Dx'd with gastritis in ER 1974  3/21/23 Status post ultrasound-guided fine-needle aspiration of hypoechoic area in the right submandibular gland.   3/21/23  Status post ultrasound-guided fine-needle aspiration of nodule superior aspect of the left lobe of the thyroid.      Today 3/6/24    1. Shares his sx started march 2023 after shoveling snow. Severe pain which continued for months. As of lately, he has not had any abdominal pain, discomfort or \"twinges\" any further. It went away slowly and starting in Fall 2023, he was able to start his stretches again. Today he is back to baseline functional health without limitations.   -when he had pain, he was taking topical diclofenac which helped  -stopped tumeric and supplement in jan 2024     2. There is a question about his increased ferritin in setting of normal iron levels and liver enzymes. He stopped drinking ETOH in Dec 2023 and is watching his diet (avoid oreos, for example). His weight at home is around 180-183#.     3. GERD -  Had about 8x episodes of heartburn since his last appt with our team which is not normal for him. They were short lived. No dysphagia, odynphagia, melena.     4. Stooling - regular, " "once daily and formed. No blood or black. Adds bran flakes in diet which helps.   -sporadic leakage of stools which is not new (ongoing for years) and he attributes to intake of certain dietary triggers  -shares he can use the toilet, feel \"sticky\" back there and when he wipes again, sees stools on the TP. This does not concern him and he is not interested in eval.     5. He is retiring soon as his wife is needing more hands on assistance for her daily activites. She appears to have a neurocognitive disorder. He prefers to avoid any invasive procedures.     6. No other questions or concerns     ROS: 10pt ROS performed and otherwise negative.    PAST MEDICAL HISTORY:  Past Medical History:   Diagnosis Date     Basal cell carcinoma        PREVIOUS ABDOMINAL/GYNECOLOGIC SURGERIES:    No past surgical history on file.      PERTINENT MEDICATIONS:  No current outpatient medications on file.     SOCIAL HISTORY:    Social History     Socioeconomic History     Marital status:      Spouse name: Not on file     Number of children: Not on file     Years of education: Not on file     Highest education level: Not on file   Occupational History     Not on file   Tobacco Use     Smoking status: Never     Smokeless tobacco: Never   Substance and Sexual Activity     Alcohol use: Not on file     Drug use: Not on file     Sexual activity: Not on file   Other Topics Concern     Not on file   Social History Narrative     Not on file     Social Determinants of Health     Financial Resource Strain: Not on file   Food Insecurity: Not on file   Transportation Needs: Not on file   Physical Activity: Not on file   Stress: Not on file   Social Connections: Not on file   Interpersonal Safety: Not on file   Housing Stability: Not on file       FAMILY HISTORY:    Family History   Problem Relation Age of Onset     Cancer Mother         skin cancer       PHYSICAL EXAMINATION:  Vitals reviewed: BP (!) 161/83 (BP Location: Right arm, Patient " Position: Sitting, Cuff Size: Adult Regular)   Pulse 64   Ht 1.829 m (6')   Wt 85 kg (187 lb 8 oz)   BMI 25.43 kg/m    Wt:   Wt Readings from Last 2 Encounters:   09/27/23 86.6 kg (191 lb)      Constitutional: aaox3, cooperative, pleasant, not dyspneic/diaphoretic, no acute distress  Eyes: Sclera anicteric/injected  Ears/nose/mouth/throat: hearing intact  Neck: supple, active ROM w/o limitation or pain   CV: No edema  Respiratory: Unlabored breathing  Abd:  Nondistended, +bs, no hepatosplenomegaly, nontender, no peritoneal signs, neg Castillo's sign   Skin: warm, perfused, no jaundice  Psych: Normal affect  MSK: Normal gait     PERTINENT STUDIES - Reviewed in EMR     Lab Results   Component Value Date    WBC 5.8 09/27/2023    HGB 14.2 09/27/2023     09/27/2023    ALT 19 09/27/2023    AST 22 09/27/2023     09/27/2023    BUN 16.4 09/27/2023    CO2 28 09/27/2023        Liver Function Studies -   Recent Labs   Lab Test 09/27/23  1602   PROTTOTAL 6.9   ALBUMIN 4.3   BILITOTAL 0.3   ALKPHOS 62   AST 22   ALT 19      Impression    1.  No acute abnormality.  2.  Stable genevieve mesentery.  Narrative    For Patients: As a result of the 21st Century Cures Act, medical imaging exams and procedure reports are released immediately into your electronic medical record. You may view this report before your referring provider. If you have questions, please contact your health care provider.    EXAM: CT ABDOMEN PELVIS W  LOCATION: [facility]  DATE: 10/23/2023    INDICATION: Mesenteric Panniculitis (hc), pain.  COMPARISON: 07/05/2023  TECHNIQUE: CT scan of the abdomen and pelvis was performed following injection of IV contrast. Multiplanar reformats were obtained. Dose reduction techniques were used.  CONTRAST: 100 mL Omnipaque 350    FINDINGS:  LOWER CHEST: Mild bibasilar atelectasis or scar. Patulous esophagus.    HEPATOBILIARY: Normal.    PANCREAS: Normal.    SPLEEN: Normal.    ADRENAL GLANDS:  Normal.    KIDNEYS/BLADDER: Stable simple and benign renal cysts, no follow-up required. No hydronephrosis. Stable mild circumferential bladder wall thickening which is consistent with chronic outlet obstruction.    BOWEL: Diverticulosis of the colon. No acute inflammatory change. No obstruction. Appendix not visualized.    LYMPH NODES: Stable mild genevieve mesentery. No lymphadenopathy by size criterion. A stable gastrohepatic ligament node which is subcentimeter in short axis and nonpathologic by size criterion.    VASCULATURE: No abdominal aortic aneurysm.    PELVIC ORGANS: Prostate not visualized, likely removed.    MUSCULOSKELETAL: Degenerative changes. Osseous demineralization.    PREVIOUS ENDOSCOPY    No results found for this or any previous visit.    ASSESSMENT/PLAN:  79 year old male with PMH of prostate Ca s/p prostatectomy  presenting to GI clinic for abd pain follow-up.     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 He had a repeat equivocal CTAP done 10/2023 with our team with labs pertinent for nonspecific elevation in ferritin (normal iron panel and liver enzymes).     -Today he is back to baseline health with no further episodes of this pain   -Will plan to discuss case with team on repeat imaging modality to follow CTAP findings - MR?  -repeat ferritin and check hepatic function panel. Ferritin elevation is nonspecific and he is overall asx. CBC WNL.     #heartburn  Intermittent w/o alarm sx  -OK to take pepcid PRN     #stooling with occ leakage  Ongoing and not bothersome to patient. He is wishing to avoid invasive work up if at all possible  -advised he start fiber per AVS in form of betsey seeds   -ensure good hydration     #CRC screening  Reportedly done within last 6 years and told no longer needing screening though I do not see these records. Last scope in chart is from 2012.   -per notes from my clinical team  10/2023, [facility] contacted and did not have CScopes for this patient in last 10 years   -will need to touch base with him - done elsewhere?     RTC 3-6 mo or PRN     Calista Rodriguez PA-C

## 2024-03-06 NOTE — PATIENT INSTRUCTIONS
It was a pleasure taking care of you today.  I've included a brief summary of our discussion and care plan from today's visit below.  Please review this information with your primary care provider.  _______________________________________________________________________    My recommendations are summarized as follows:    Try diaphragmatic breathing exercises - this is a good source  https://www.[email or web address].org/conditions-treatments/digestive-and-liver-health/diaphragmatic-breathing-gi-patients  If you have heartburn symptoms, OK to take pepcid 20 mg up to twice a day to help -   If you'd like to meet with our dietitian, let me know via [employer]. We can put an order in for this.   Labs ordered - OK to get at [facility]. Please call to schedule at [phone]  Will speak with GI attending about follow up imaging. I'm thinking maybe MRI of abdomen but will let you know.   For bowels - can add betsey seeds/ flax seeds. Start low at 1 tsp daily x 2 weeks. Increase by a tsp weekly for goal of 1-2 tbsp in a day   Ensure good hydration     Please call our clinic or send a [employer] message to us if you have any questions or concerns. [employer] messages are answered by your nurse or doctor typically within 24 hours.  Please allow extra time on weekends and holidays    Return to GI Clinic in 3-6 months to review your progress.    _______________________________________________________________________    How do I schedule labs, imaging studies, or procedures that were ordered in clinic today?      Labs: To schedule lab appointment at the [facility], use my chart or call [phone]. If you have a [facility] lab closer to home where you are regularly seen you can give them a call.      Procedures: If a colonoscopy, upper endoscopy, breath test, esophageal manometry, or pH impedence was ordered today, our endoscopy team will call you to schedule this. If you have not heard from our endoscopy team within a week,  please call [phone] option 2 to schedule.      Imaging Studies: If you were scheduled for a CT scan, X-ray, MRI, ultrasound, HIDA scan or other imaging study, please call [phone] to have this scheduled.      Referral: If a referral to another specialty was ordered, expect a phone call or follow instructions above. If you have not heard from anyone regarding your referral in a week, please call our clinic to check the status.      Who do I call with any questions after my visit?  Please be in touch if there are any further questions that arise following today's visit.  There are multiple ways to contact your gastroenterology care team.       During business hours, you may reach a Gastroenterology nurse at [phone]     To schedule or reschedule an appointment, please call [phone].      You can always send a secure message through [employer] Author Hub.  The [employer] messages are answered by your nurse or doctor typically within 24 hours.  Please allow extra time on weekends and holidays.       For urgent/emergent questions after business hours, you may reach the on-call GI Fellow by contacting the [facility]  at [phone].     How will I get the results of any tests ordered?    You will receive all of your results.  If you have signed up for myOrdert, any tests ordered at your visit will be available to you after your physician reviews them.  Typically this takes 1-2 weeks.  If there are urgent results that require a change in your care plan, your physician or nurse will call you to discuss the next steps.       What is The Author Hub?  The Author Hub is a secure way for you to access all of your healthcare records from the [facility].  It is a web based computer program, so you can sign on to it from any location.  It also allows you to send secure messages to your care team.  I recommend signing up for The Author Hub access if you have not already done so and are comfortable with using a computer.      "  How to I schedule a follow-up visit?  If you did not schedule a follow-up visit today, please call [phone] to schedule a follow-up office visit.      Sincerely,    Calista Rodriguez PA-C  Gastroenterology  ---  Lifestyle modifications for gastroesophageal reflux disease (GERD).     1. Change your eating habits.  -- It's best to eat several small meals instead of two or three large meals.  -- After you eat, wait 2 to 3 hours before you lie down. Late-night snacks aren't a good idea.  -- Chocolate, mint, and alcohol can make GERD worse. They relax the valve between the esophagus and the stomach.  -- Spicy foods, fatty foods, foods that have a lot of acid (like tomatoes and oranges), and coffee can make GERD symptoms worse in some people. If your symptoms are worse after you eat a certain food, you may want to stop eating that food to see if your symptoms get better.    2. Do not smoke or chew tobacco.    3. If you have GERD symptoms at night, raise the head of your bed 6 in. (15 cm) to 8 in. (20 cm) by putting the frame on blocks or placing a foam wedge under the head of your mattress. (Adding extra pillows does not work.)    4. Avoid or reduce pressure on your stomach. Don't wear tight clothing around your middle.    5. Lose weight if you need to. Losing just 5 to 10 pounds can help.    ---  Toileting techniques  -don t ignore the urge to defecate (try not to hold it in). Normal to have a bowel movement after waking in morning, after eating, or whenever \"nature calls\"  -limit time pushing to less than 10 min on the toilet. If you are not able to have a bowel movement, stop, and try again when you have the urge    Positioning  1. Elevate knees above hips (use a squatty potty)?  2. Lean forward, elbows rested on knees   3. Bulge out abdomen, and straighten your spine    Correct position  -knees higher than hips  -lean forward and put elbows on knees  ?-bulge our your abdomen  -straighten your spine    Position is " similar to Carmelo branham  [employer]

## 2024-03-07 ENCOUNTER — TELEPHONE (OUTPATIENT)
Dept: GASTROENTEROLOGY | Facility: CLINIC | Age: 79
End: 2024-03-07
Payer: COMMERCIAL

## 2024-03-07 DIAGNOSIS — K65.4 MESENTERIC PANNICULITIS (H): Primary | ICD-10-CM

## 2024-03-07 NOTE — TELEPHONE ENCOUNTER
[facility] Call Center    Phone Message    May a detailed message be left on voicemail: yes     Reason for Call: Order(s): Other:   Reason for requested: Celiac Labs, For Lab Visit Tomorrow 03/08 @11:15am   Date needed: ASAP  Provider name: Calista Rodriguez     Action Taken: Message routed to:  [facility] ([facility] GI    Travel Screening: Not Applicable

## 2024-03-08 ENCOUNTER — LAB (OUTPATIENT)
Dept: LAB | Facility: CLINIC | Age: 79
End: 2024-03-08
Payer: COMMERCIAL

## 2024-03-08 DIAGNOSIS — K65.4 MESENTERIC PANNICULITIS (H): ICD-10-CM

## 2024-03-08 DIAGNOSIS — R79.9 ABNORMAL FINDING OF BLOOD CHEMISTRY, UNSPECIFIED: ICD-10-CM

## 2024-03-08 LAB
ALBUMIN SERPL BCG-MCNC: 4.2 G/DL (ref 3.5–5.2)
ALP SERPL-CCNC: 60 U/L (ref 40–150)
ALT SERPL W P-5'-P-CCNC: 18 U/L (ref 0–70)
AST SERPL W P-5'-P-CCNC: 18 U/L (ref 0–45)
BILIRUB DIRECT SERPL-MCNC: <0.2 MG/DL (ref 0–0.3)
BILIRUB SERPL-MCNC: 0.5 MG/DL
FERRITIN SERPL-MCNC: 604 NG/ML (ref 31–409)
PROT SERPL-MCNC: 7.2 G/DL (ref 6.4–8.3)

## 2024-03-08 PROCEDURE — 82784 ASSAY IGA/IGD/IGG/IGM EACH: CPT

## 2024-03-08 PROCEDURE — 80076 HEPATIC FUNCTION PANEL: CPT

## 2024-03-08 PROCEDURE — 36415 COLL VENOUS BLD VENIPUNCTURE: CPT

## 2024-03-08 PROCEDURE — 86364 TISS TRNSGLTMNASE EA IG CLAS: CPT

## 2024-03-08 PROCEDURE — 82728 ASSAY OF FERRITIN: CPT

## 2024-03-11 LAB
IGA SERPL-MCNC: 314 MG/DL (ref 84–499)
TTG IGA SER-ACNC: 1.3 U/ML
TTG IGG SER-ACNC: <0.6 U/ML

## 2024-06-07 ENCOUNTER — OFFICE VISIT (OUTPATIENT)
Dept: GASTROENTEROLOGY | Facility: CLINIC | Age: 79
End: 2024-06-07
Payer: COMMERCIAL

## 2024-06-07 ENCOUNTER — E-CONSULT (OUTPATIENT)
Dept: ONCOLOGY | Facility: CLINIC | Age: 79
End: 2024-06-07

## 2024-06-07 VITALS
DIASTOLIC BLOOD PRESSURE: 78 MMHG | BODY MASS INDEX: 24.79 KG/M2 | OXYGEN SATURATION: 99 % | WEIGHT: 183 LBS | HEIGHT: 72 IN | HEART RATE: 66 BPM | SYSTOLIC BLOOD PRESSURE: 144 MMHG

## 2024-06-07 DIAGNOSIS — E83.10 DISORDER OF IRON METABOLISM, UNSPECIFIED: ICD-10-CM

## 2024-06-07 DIAGNOSIS — R79.89 ELEVATED FERRITIN: ICD-10-CM

## 2024-06-07 DIAGNOSIS — K65.4 MESENTERIC PANNICULITIS (H): Primary | ICD-10-CM

## 2024-06-07 DIAGNOSIS — R93.3 ABNORMAL FINDINGS ON DIAGNOSTIC IMAGING OF OTHER PARTS OF DIGESTIVE TRACT: ICD-10-CM

## 2024-06-07 PROCEDURE — 99214 OFFICE O/P EST MOD 30 MIN: CPT | Performed by: PHYSICIAN ASSISTANT

## 2024-06-07 PROCEDURE — 99451 NTRPROF PH1/NTRNET/EHR 5/>: CPT | Performed by: INTERNAL MEDICINE

## 2024-06-07 ASSESSMENT — PAIN SCALES - GENERAL: PAINLEVEL: NO PAIN (0)

## 2024-06-07 NOTE — PROGRESS NOTES
6/7/2024     E-Consult has been accepted.    Interprofessional consultation requested by:  Calista Rodriguez PA-C      Clinical Question/Purpose: MY CLINICAL QUESTION IS: elevated ferritin of unclear significance - please give input. thank you    Patient assessment and information reviewed:   79 year old gentleman with history of prostate cancer s/p prostatectomy being treated for mesenteric panniculoitis and abdominal pain by GI. Ferritin levels repeated elevated prompting consult.     Review of medications notable that patient has ceased all alcohol use since Dec 2023 with some improvement in ferritin.   Additionally, patient Is taking supplement boswellia- Boswellia also called  Frankincense is an extract of the gummy oleoresin that is rich in triterpenic acids and has been used for centuries in traditional Ayurvedic medicine to treat inflammatory conditions. Also taking tumeric. Boswellia does NOT have known liver toxicity issues. However, turmeric DOES--- per recent reviews incidence is increasing in [place] with latency of 1-4 months between starting tumeric an onset (GEMMA Orellana, MIC Michel, LOUISA Andersen, DEANA Schwartz., Valerio, H., Josette, A. S., Ayo, H. L., Saqib, LAMAR. J., Jomar, M. S., Aixa, J. H., Ana M, I. A., Kleiner, D. E., EN Pino., OMAYRA Shah., & LAMAR Katz. (2023). Liver Injury Associated with Turmeric-A Growing Problem: Ten Cases from the Drug-Induced Liver Injury Network [DILIN]. The American journal of medicine, 136(2), 200-206. https://doi.org/10.1016/j.amjmed.2022.09.026)    Other notable labs:  Normal complete blood count including normal differential  Normal iron saturation (24%) and normal iron and TIBC    Notable labs:  ESR 10 (10/6/2023), ferritin 987 (9/27/2023)    Recommendations:  [ ] have patient stop all supplements- especially tumeric  [ ] continue to abstain from alcohol use  [ ] obtain hemochromatosis mutation testing  [ ] consider checking HIV, HbA1c,  testosterone and acute phase markers IL-6, fibrinogen  [ ] consider liver MRI ([facility])  [ ] consider PSA screening (if not done outside)      The recommendations provided in this E-Consult are based on a review of clinical data pertinent to the clinical question presented, without a review of the patient's complete medical record or, the benefit of a comprehensive in-person or virtual patient evaluation. This consultation should not replace the clinical judgement and evaluation of the provider ordering this E-Consult. Any new clinical issues, or changes in patient status since the filing of this E-Consult will need to be taken into account when assessing these recommendations. Please contact me if you have further questions.    My total time spent reviewing clinical information and formulating assessment was 20 minutes.        Lisy Adams MD/PhD

## 2024-06-07 NOTE — NURSING NOTE
Chief Complaint   Patient presents with    Follow Up       Vitals:    06/07/24 0939   BP: (!) 144/78   Pulse: 66   SpO2: 99%   Weight: 83 kg (183 lb)   Height: 1.829 m (6')       Body mass index is 24.82 kg/m .    Blood pressure elevated. Provider notified. Recheck offered.       Margo Hooks

## 2024-06-07 NOTE — PATIENT INSTRUCTIONS
It was a pleasure taking care of you today.  I've included a brief summary of our discussion and care plan from today's visit below.  Please review this information with your primary care provider.  _______________________________________________________________________    My recommendations are summarized as follows:    Labs and PET-CT scan - [facility]   Will ask for colonoscopy again - see if they can find it this time around  Will reach out to hematology about ?elevated ferritin to see what they say     Please call our clinic or send a [employer] message to us if you have any questions or concerns. [employer] messages are answered by your nurse or doctor typically within 24 hours.  Please allow extra time on weekends and holidays    Return to GI Clinic in 6 months to review your progress.    _______________________________________________________________________    How do I schedule labs, imaging studies, or procedures that were ordered in clinic today?      Labs: To schedule lab appointment at the [facility], use my chart or call [phone]. If you have a [facility] lab closer to home where you are regularly seen you can give them a call.      Procedures: If a colonoscopy, upper endoscopy, breath test, esophageal manometry, or pH impedence was ordered today, our endoscopy team will call you to schedule this. If you have not heard from our endoscopy team within a week, please call [phone] option 2 to schedule.      Imaging Studies: If you were scheduled for a CT scan, X-ray, MRI, ultrasound, HIDA scan or other imaging study, please call [phone] to have this scheduled.      Referral: If a referral to another specialty was ordered, expect a phone call or follow instructions above. If you have not heard from anyone regarding your referral in a week, please call our clinic to check the status.      Who do I call with any questions after my visit?  Please be in touch  if there are any further questions that arise following today's visit.  There are multiple ways to contact your gastroenterology care team.       During business hours, you may reach a Gastroenterology nurse at [phone]     To schedule or reschedule an appointment, please call [phone].      You can always send a secure message through [employer].  [employer] messages are answered by your nurse or doctor typically within 24 hours.  Please allow extra time on weekends and holidays.       For urgent/emergent questions after business hours, you may reach the on-call GI Fellow by contacting the [facility]  at [phone].     How will I get the results of any tests ordered?    You will receive all of your results.  If you have signed up for [employer], any tests ordered at your visit will be available to you after your physician reviews them.  Typically this takes 1-2 weeks.  If there are urgent results that require a change in your care plan, your physician or nurse will call you to discuss the next steps.       What is [employer]?  [employer] is a secure way for you to access all of your healthcare records from the [facility].  It is a web based computer program, so you can sign on to it from any location.  It also allows you to send secure messages to your care team.  I recommend signing up for [employer] access if you have not already done so and are comfortable with using a computer.       How to I schedule a follow-up visit?  If you did not schedule a follow-up visit today, please call [phone] to schedule a follow-up office visit.      Sincerely,    Calista Rodriguez PA-C  Gastroenterology

## 2024-06-07 NOTE — PROGRESS NOTES
GI CLINIC VISIT    HPI: 79 year old male with PMH of prostate ca s/p prostatectomy  presenting to GI clinic for abd pain.  Established with Dr AMNA Sanchez 9/2023 - today is my second meet with the patient.     Today 6/7/24  Had vertigo episode since last visit, seen with primary. It was a one time incident and he has not had it again. Standing in front of mirror, over 2d, felt like moving despite being stationary. No localizaing sx.   Overall continues to feel well.  No further episodes of abdominal pain.  He is eating well.  Functional status is back to baseline.  No nausea/vomiting.    Weight and appetite are stable  He continues to abstain from alcohol.  Recent ferritin continued to be elevated as ordered by primary with the labs that were done initially in the general GI clinic.  He has not not been checked for hemochromatosis yet.   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  He is in the midst of retiring in order to better care for her and be present in their life together.     Dr Sanchez note   Feels [place] snow triggered it severe abdo pain sometime around March 2023. Pain was severe. Continued, July 5 2023 -- CT scan - mesenteric panniculitis noted     Has been using turmeric, diclofenac cream(local), and boswellia in order to reduce the inflammation and pain  Cutting back on boswellia, is able to sleep on both sides - markers to him for improvement     Med hx/  S/p prostatectomy 2011  Colonoscopy - last one maybe 6 yrs ago - was told does not need any more  Kidney stones in age 30s removed with basket device  Appendectomy age 12  Dx'd with gastritis in ER 1974  3/21/23 Status post ultrasound-guided fine-needle aspiration of hypoechoic area in the right submandibular gland.   3/21/23  Status post ultrasound-guided fine-needle aspiration of nodule superior aspect of the left lobe of the thyroid.     "  Today 3/6/24    1. Shares his sx started march 2023 after shoveling snow. Severe pain which continued for months. As of lately, he has not had any abdominal pain, discomfort or \"twinges\" any further. It went away slowly and starting in Fall 2023, he was able to start his stretches again. Today he is back to baseline functional health without limitations.   -when he had pain, he was taking topical diclofenac which helped  -stopped tumeric and supplement in jan 2024     2. There is a question about his increased ferritin in setting of normal iron levels and liver enzymes. He stopped drinking ETOH in Dec 2023 and is watching his diet (avoid oreos, for example). His weight at home is around 180-183#.     3. GERD -  Had about 8x episodes of heartburn since his last appt with our team which is not normal for him. They were short lived. No dysphagia, odynphagia, melena.     4. Stooling - regular, once daily and formed. No blood or black. Adds bran flakes in diet which helps.   -sporadic leakage of stools which is not new (ongoing for years) and he attributes to intake of certain dietary triggers  -shares he can use the toilet, feel \"sticky\" back there and when he wipes again, sees stools on the TP. This does not concern him and he is not interested in eval.     5. He is retiring soon as his wife is needing more hands on assistance for her daily activites. She appears to have a neurocognitive disorder. He prefers to avoid any invasive procedures.     6. No other questions or concerns     ROS: 10pt ROS performed and otherwise negative.    PAST MEDICAL HISTORY:  Past Medical History:   Diagnosis Date    Basal cell carcinoma        PREVIOUS ABDOMINAL/GYNECOLOGIC SURGERIES:    No past surgical history on file.      PERTINENT MEDICATIONS:  No current outpatient medications on file.     SOCIAL HISTORY:    Social History     Socioeconomic History    Marital status:      Spouse name: Not on file    Number of children: Not " on file    Years of education: Not on file    Highest education level: Not on file   Occupational History    Not on file   Tobacco Use    Smoking status: Never    Smokeless tobacco: Never   Substance and Sexual Activity    Alcohol use: Not on file    Drug use: Not on file    Sexual activity: Not on file   Other Topics Concern    Not on file   Social History Narrative    Not on file     Social Determinants of Health     Financial Resource Strain: High Risk (12/29/2021)    Received from [employer]    Financial Resource Strain     Difficulty of Paying Living Expenses: Not on file     Difficulty of Paying Living Expenses: Not on file   Food Insecurity: Not on file   Transportation Needs: Not on file   Physical Activity: Not on file   Stress: Not on file   Social Connections: Unknown (12/29/2021)    Received from [employer]    Social Connections     Frequency of Communication with Friends and Family: Not on file   Interpersonal Safety: Not on file   Housing Stability: Not on file       FAMILY HISTORY:    Family History   Problem Relation Age of Onset    Cancer Mother         skin cancer       PHYSICAL EXAMINATION:  Vitals reviewed: There were no vitals taken for this visit.  Wt:   Wt Readings from Last 2 Encounters:   03/06/24 85 kg (187 lb 8 oz)   09/27/23 86.6 kg (191 lb)      Constitutional: aaox3, cooperative, pleasant, not dyspneic/diaphoretic, no acute distress  Eyes: Sclera anicteric/injected  Ears/nose/mouth/throat: hearing intact  Neck: supple, active ROM w/o limitation or pain   CV: No edema  Respiratory: Unlabored breathing  Abd:  Nondistended, +bs, no hepatosplenomegaly, nontender, no peritoneal signs, neg Castillo's sign   Skin: warm, perfused, no jaundice  Psych: Normal affect  MSK: Normal gait     PERTINENT STUDIES - Reviewed in EMR     Lab Results   Component Value Date    WBC 5.8 09/27/2023    HGB 14.2 09/27/2023     09/27/2023     ALT 18 03/08/2024    ALT 19 09/27/2023    AST 18 03/08/2024    AST 22 09/27/2023     09/27/2023    BUN 16.4 09/27/2023    CO2 28 09/27/2023        Liver Function Studies -   Recent Labs   Lab Test 09/27/23  1602   PROTTOTAL 6.9   ALBUMIN 4.3   BILITOTAL 0.3   ALKPHOS 62   AST 22   ALT 19      Impression    1.  No acute abnormality.  2.  Stable genevieve mesentery.  Narrative    For Patients: As a result of the 21st Century Cures Act, medical imaging exams and procedure reports are released immediately into your electronic medical record. You may view this report before your referring provider. If you have questions, please contact your health care provider.    EXAM: CT ABDOMEN PELVIS W  LOCATION: [facility]  DATE: 10/23/2023    INDICATION: Mesenteric Panniculitis (hc), pain.  COMPARISON: 07/05/2023  TECHNIQUE: CT scan of the abdomen and pelvis was performed following injection of IV contrast. Multiplanar reformats were obtained. Dose reduction techniques were used.  CONTRAST: 100 mL Omnipaque 350    FINDINGS:  LOWER CHEST: Mild bibasilar atelectasis or scar. Patulous esophagus.    HEPATOBILIARY: Normal.    PANCREAS: Normal.    SPLEEN: Normal.    ADRENAL GLANDS: Normal.    KIDNEYS/BLADDER: Stable simple and benign renal cysts, no follow-up required. No hydronephrosis. Stable mild circumferential bladder wall thickening which is consistent with chronic outlet obstruction.    BOWEL: Diverticulosis of the colon. No acute inflammatory change. No obstruction. Appendix not visualized.    LYMPH NODES: Stable mild genevieve mesentery. No lymphadenopathy by size criterion. A stable gastrohepatic ligament node which is subcentimeter in short axis and nonpathologic by size criterion.    VASCULATURE: No abdominal aortic aneurysm.    PELVIC ORGANS: Prostate not visualized, likely removed.    MUSCULOSKELETAL: Degenerative changes. Osseous demineralization.    PREVIOUS ENDOSCOPY    No results found for this or  any previous visit.    ASSESSMENT/PLAN:  79 year old male with PMH of prostate Ca s/p prostatectomy  presenting to GI clinic for abd pain follow-up.     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 He had a repeat equivocal CTAP done 10/2023 with our team with labs pertinent for nonspecific elevation in ferritin (normal iron panel and liver enzymes).     -Today he is back to baseline health with no further episodes of this pain   -Proceed with fdg pet-ct to follow genevieve mesentery seen on CT abdomen pelvis  -Regarding elevated ferritin, proceed with hemochromatosis screening.  Continue to abstain from alcohol.  E consult to hematology.    #heartburn  Intermittent w/o alarm sx  -OK to take pepcid PRN     #stooling with occ leakage  Ongoing and not bothersome to patient. He is wishing to avoid invasive work up if at all possible  -advised he start fiber per AVS in form of betsey seeds   -ensure good hydration     #CRC screening  Reportedly done within last 6 years and told no longer needing screening though I do not see these records. Last scope in chart is from 2012.   -per notes from my clinical team 10/2023, [facility] contacted and did not have CScopes for this patient in last 10 years   -will need to touch base with him - done elsewhere?     RTC - 6 mo     Calista Rodriguez PA-C    Follow up: As planned above. Today, I personally spent 30 minutes in direct face to face time with the patient, of which greater than 50% of the time was spent in patient education and counseling as described above. Approximately  minutes were spent on indirect care associated with the patient's consultation including but not limited to review of: patient medical records to date, clinic visits, hospital records, lab results, imaging studies, procedural documentation, and coordinating care with other providers. The findings from this review are summarized in  the above note. All of the above accounted for a cumulative time of 30 minutes and was performed on the date of service.

## 2024-06-07 NOTE — LETTER
6/7/2024      Suleiman Davis  [address] [place] [id number]      Dear Colleague,    Thank you for referring your patient, Suleiman Davis, to the [facility] [place]. Please see a copy of my visit note below.      GI CLINIC VISIT    HPI: 79 year old male with PMH of prostate ca s/p prostatectomy  presenting to GI clinic for abd pain.  Established with Dr AMNA Sanchez 9/2023 - today is my second meet with the patient.     Today 6/7/24  Had vertigo episode since last visit, seen with primary. It was a one time incident and he has not had it again. Standing in front of mirror, over 2d, felt like moving despite being stationary. No localizaing sx.   Overall continues to feel well.  No further episodes of abdominal pain.  He is eating well.  Functional status is back to baseline.  No nausea/vomiting.    Weight and appetite are stable  He continues to abstain from alcohol.  Recent ferritin continued to be elevated as ordered by primary with the labs that were done initially in the general GI clinic.  He has not not been checked for hemochromatosis yet.   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  He is in the midst of retiring in order to better care for her and be present in their life together.     Dr Sanchez note   Feels [place] snow triggered it severe abdo pain sometime around March 2023. Pain was severe. Continued, July 5 2023 -- CT scan - mesenteric panniculitis noted     Has been using turmeric, diclofenac cream(local), and boswellia in order to reduce the inflammation and pain  Cutting back on boswellia, is able to sleep on both sides - markers to him for improvement     Med hx/  S/p prostatectomy 2011  Colonoscopy - last one maybe 6 yrs ago - was told does not need any more  Kidney stones in age 30s removed with basket device  Appendectomy age 12  Dx'd with gastritis in ER  "1974  3/21/23 Status post ultrasound-guided fine-needle aspiration of hypoechoic area in the right submandibular gland.   3/21/23  Status post ultrasound-guided fine-needle aspiration of nodule superior aspect of the left lobe of the thyroid.      Today 3/6/24    1. Shares his sx started march 2023 after shoveling snow. Severe pain which continued for months. As of lately, he has not had any abdominal pain, discomfort or \"twinges\" any further. It went away slowly and starting in Fall 2023, he was able to start his stretches again. Today he is back to baseline functional health without limitations.   -when he had pain, he was taking topical diclofenac which helped  -stopped tumeric and supplement in jan 2024     2. There is a question about his increased ferritin in setting of normal iron levels and liver enzymes. He stopped drinking ETOH in Dec 2023 and is watching his diet (avoid oreos, for example). His weight at home is around 180-183#.     3. GERD -  Had about 8x episodes of heartburn since his last appt with our team which is not normal for him. They were short lived. No dysphagia, odynphagia, melena.     4. Stooling - regular, once daily and formed. No blood or black. Adds bran flakes in diet which helps.   -sporadic leakage of stools which is not new (ongoing for years) and he attributes to intake of certain dietary triggers  -shares he can use the toilet, feel \"sticky\" back there and when he wipes again, sees stools on the TP. This does not concern him and he is not interested in eval.     5. He is retiring soon as his wife is needing more hands on assistance for her daily activites. She appears to have a neurocognitive disorder. He prefers to avoid any invasive procedures.     6. No other questions or concerns     ROS: 10pt ROS performed and otherwise negative.    PAST MEDICAL HISTORY:  Past Medical History:   Diagnosis Date    Basal cell carcinoma        PREVIOUS ABDOMINAL/GYNECOLOGIC SURGERIES:    No past " surgical history on file.      PERTINENT MEDICATIONS:  No current outpatient medications on file.     SOCIAL HISTORY:    Social History     Socioeconomic History    Marital status:      Spouse name: Not on file    Number of children: Not on file    Years of education: Not on file    Highest education level: Not on file   Occupational History    Not on file   Tobacco Use    Smoking status: Never    Smokeless tobacco: Never   Substance and Sexual Activity    Alcohol use: Not on file    Drug use: Not on file    Sexual activity: Not on file   Other Topics Concern    Not on file   Social History Narrative    Not on file     Social Determinants of Health     Financial Resource Strain: High Risk (12/29/2021)    Received from [employer]    Financial Resource Strain     Difficulty of Paying Living Expenses: Not on file     Difficulty of Paying Living Expenses: Not on file   Food Insecurity: Not on file   Transportation Needs: Not on file   Physical Activity: Not on file   Stress: Not on file   Social Connections: Unknown (12/29/2021)    Received from [employer]    Social Connections     Frequency of Communication with Friends and Family: Not on file   Interpersonal Safety: Not on file   Housing Stability: Not on file       FAMILY HISTORY:    Family History   Problem Relation Age of Onset    Cancer Mother         skin cancer       PHYSICAL EXAMINATION:  Vitals reviewed: There were no vitals taken for this visit.  Wt:   Wt Readings from Last 2 Encounters:   03/06/24 85 kg (187 lb 8 oz)   09/27/23 86.6 kg (191 lb)      Constitutional: aaox3, cooperative, pleasant, not dyspneic/diaphoretic, no acute distress  Eyes: Sclera anicteric/injected  Ears/nose/mouth/throat: hearing intact  Neck: supple, active ROM w/o limitation or pain   CV: No edema  Respiratory: Unlabored breathing  Abd:  Nondistended, +bs, no hepatosplenomegaly, nontender, no peritoneal signs,  neg Castillo's sign   Skin: warm, perfused, no jaundice  Psych: Normal affect  MSK: Normal gait     PERTINENT STUDIES - Reviewed in EMR     Lab Results   Component Value Date    WBC 5.8 09/27/2023    HGB 14.2 09/27/2023     09/27/2023    ALT 18 03/08/2024    ALT 19 09/27/2023    AST 18 03/08/2024    AST 22 09/27/2023     09/27/2023    BUN 16.4 09/27/2023    CO2 28 09/27/2023        Liver Function Studies -   Recent Labs   Lab Test 09/27/23  1602   PROTTOTAL 6.9   ALBUMIN 4.3   BILITOTAL 0.3   ALKPHOS 62   AST 22   ALT 19      Impression    1.  No acute abnormality.  2.  Stable genevieve mesentery.  Narrative    For Patients: As a result of the 21st Century Cures Act, medical imaging exams and procedure reports are released immediately into your electronic medical record. You may view this report before your referring provider. If you have questions, please contact your health care provider.    EXAM: CT ABDOMEN PELVIS W  LOCATION: [facility]  DATE: 10/23/2023    INDICATION: Mesenteric Panniculitis (hc), pain.  COMPARISON: 07/05/2023  TECHNIQUE: CT scan of the abdomen and pelvis was performed following injection of IV contrast. Multiplanar reformats were obtained. Dose reduction techniques were used.  CONTRAST: 100 mL Omnipaque 350    FINDINGS:  LOWER CHEST: Mild bibasilar atelectasis or scar. Patulous esophagus.    HEPATOBILIARY: Normal.    PANCREAS: Normal.    SPLEEN: Normal.    ADRENAL GLANDS: Normal.    KIDNEYS/BLADDER: Stable simple and benign renal cysts, no follow-up required. No hydronephrosis. Stable mild circumferential bladder wall thickening which is consistent with chronic outlet obstruction.    BOWEL: Diverticulosis of the colon. No acute inflammatory change. No obstruction. Appendix not visualized.    LYMPH NODES: Stable mild genevieve mesentery. No lymphadenopathy by size criterion. A stable gastrohepatic ligament node which is subcentimeter in short axis and nonpathologic by  size criterion.    VASCULATURE: No abdominal aortic aneurysm.    PELVIC ORGANS: Prostate not visualized, likely removed.    MUSCULOSKELETAL: Degenerative changes. Osseous demineralization.    PREVIOUS ENDOSCOPY    No results found for this or any previous visit.    ASSESSMENT/PLAN:  79 year old male with PMH of prostate Ca s/p prostatectomy  presenting to GI clinic for abd pain follow-up.     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 He had a repeat equivocal CTAP done 10/2023 with our team with labs pertinent for nonspecific elevation in ferritin (normal iron panel and liver enzymes).     -Today he is back to baseline health with no further episodes of this pain   -Proceed with fdg pet-ct to follow genevieve mesentery seen on CT abdomen pelvis  -Regarding elevated ferritin, proceed with hemochromatosis screening.  Continue to abstain from alcohol.  E consult to hematology.    #heartburn  Intermittent w/o alarm sx  -OK to take pepcid PRN     #stooling with occ leakage  Ongoing and not bothersome to patient. He is wishing to avoid invasive work up if at all possible  -advised he start fiber per AVS in form of betsey seeds   -ensure good hydration     #CRC screening  Reportedly done within last 6 years and told no longer needing screening though I do not see these records. Last scope in chart is from 2012.   -per notes from my clinical team 10/2023, [facility] contacted and did not have CScopes for this patient in last 10 years   -will need to touch base with him - done elsewhere?     RTC - 6 mo           Follow up: As planned above. Today, I personally spent 30 minutes in direct face to face time with the patient, of which greater than 50% of the time was spent in patient education and counseling as described above. Approximately  minutes were spent on indirect care associated with the patient's consultation including but not limited  to review of: patient medical records to date, clinic visits, hospital records, lab results, imaging studies, procedural documentation, and coordinating care with other providers. The findings from this review are summarized in the above note. All of the above accounted for a cumulative time of 30 minutes and was performed on the date of service.       Again, thank you for allowing me to participate in the care of your patient.      Sincerely,    Calista Rodriguez PA-C

## 2024-06-12 ENCOUNTER — LAB (OUTPATIENT)
Dept: LAB | Facility: CLINIC | Age: 79
End: 2024-06-12
Payer: COMMERCIAL

## 2024-06-12 DIAGNOSIS — E83.10 DISORDER OF IRON METABOLISM, UNSPECIFIED: ICD-10-CM

## 2024-06-12 DIAGNOSIS — R79.89 ELEVATED FERRITIN: ICD-10-CM

## 2024-06-12 DIAGNOSIS — K65.4 MESENTERIC PANNICULITIS (H): ICD-10-CM

## 2024-06-12 LAB
FERRITIN SERPL-MCNC: 707 NG/ML (ref 31–409)
LAB DIRECTOR COMMENTS: NORMAL
LAB DIRECTOR DISCLAIMER: NORMAL
LAB DIRECTOR INTERPRETATION: NORMAL
LAB DIRECTOR METHODOLOGY: NORMAL
LAB DIRECTOR RESULTS: NORMAL
SPECIMEN DESCRIPTION: NORMAL

## 2024-06-12 PROCEDURE — 82728 ASSAY OF FERRITIN: CPT

## 2024-06-12 PROCEDURE — G0452 MOLECULAR PATHOLOGY INTERPR: HCPCS | Mod: 26 | Performed by: PATHOLOGY

## 2024-06-12 PROCEDURE — 36415 COLL VENOUS BLD VENIPUNCTURE: CPT

## 2024-06-12 PROCEDURE — 81256 HFE GENE: CPT

## 2024-06-27 ENCOUNTER — HOSPITAL ENCOUNTER (OUTPATIENT)
Dept: PET IMAGING | Facility: CLINIC | Age: 79
Discharge: HOME | End: 2024-06-27
Attending: PHYSICIAN ASSISTANT | Admitting: PHYSICIAN ASSISTANT
Payer: MEDICARE

## 2024-06-27 DIAGNOSIS — R93.3 ABNORMAL FINDINGS ON DIAGNOSTIC IMAGING OF OTHER PARTS OF DIGESTIVE TRACT: ICD-10-CM

## 2024-06-27 DIAGNOSIS — K65.4 MESENTERIC PANNICULITIS (H): ICD-10-CM

## 2024-06-27 LAB
CREAT BLD-MCNC: 0.9 MG/DL (ref 0.7–1.3)
EGFRCR SERPLBLD CKD-EPI 2021: >60 ML/MIN/1.73M2

## 2024-06-27 PROCEDURE — 82565 ASSAY OF CREATININE: CPT

## 2024-06-27 PROCEDURE — 250N000011 HC RX IP 250 OP 636: Performed by: PHYSICIAN ASSISTANT

## 2024-06-27 PROCEDURE — A9552 F18 FDG: HCPCS | Performed by: PHYSICIAN ASSISTANT

## 2024-06-27 PROCEDURE — 71260 CT THORAX DX C+: CPT

## 2024-06-27 PROCEDURE — 343N000001 HC RX 343: Performed by: PHYSICIAN ASSISTANT

## 2024-06-27 PROCEDURE — 78816 PET IMAGE W/CT FULL BODY: CPT | Mod: MG,PI

## 2024-06-27 RX ORDER — IOPAMIDOL 755 MG/ML
10-135 INJECTION, SOLUTION INTRAVASCULAR ONCE
Status: COMPLETED | OUTPATIENT
Start: 2024-06-27 | End: 2024-06-27

## 2024-06-27 RX ORDER — FLUDEOXYGLUCOSE F 18 200 MCI/ML
10-18 INJECTION, SOLUTION INTRAVENOUS ONCE
Status: COMPLETED | OUTPATIENT
Start: 2024-06-27 | End: 2024-06-27

## 2024-06-27 RX ADMIN — IOPAMIDOL 112 ML: 755 INJECTION, SOLUTION INTRAVENOUS at 15:21

## 2024-06-27 RX ADMIN — FLUDEOXYGLUCOSE F 18 11.41 MILLICURIE: 200 INJECTION, SOLUTION INTRAVENOUS at 14:31

## 2024-07-03 ENCOUNTER — TELEPHONE (OUTPATIENT)
Dept: GASTROENTEROLOGY | Facility: CLINIC | Age: 79
End: 2024-07-03
Payer: COMMERCIAL

## 2024-07-03 NOTE — TELEPHONE ENCOUNTER
Discussed work up with patient     -Hemochromatosis mutation -          INTERPRETATION    This patient is heterozygous for the H63D mutation, but does not carry the C282Y or the S65C mutations in the HFE gene. Heterozygosity for the  H63D mutation in the absence of the C282Y mutation does not significantly predispose individuals to iron overload. Genetic counseling services are available upon request.     -Heme e-consult -   Recommendations:  [ ] have patient stop all supplements- especially tumeric  [ ] continue to abstain from alcohol use  [ ] obtain hemochromatosis mutation testing  [ ] consider checking HIV, HbA1c, testosterone and acute phase markers IL-6, fibrinogen  [ ] consider liver MRI ([facility])  [ ] consider PSA screening (if not done outside)    PT-CT   Impression   IMPRESSION:    1.  Stable genevieve mesentery without FDG uptake or lymphadenopathy.    2.  Focal FDG uptake in the lower descending colon of which 20% represent benign/malignant polyps. Recommend colonoscopy for further evaluation.    3.  Segmental FDG uptake in the midesophagus. While this may simply represent reflux esophagitis in the setting of a small gastroesophageal hiatal hernia, endoscopy could be helpful to exclude esophageal dysplasia/malignancy which can appear similar.       Spoke with Mr. Davis about the above work up.   He will stop tumeric. He had some reflux issues a few months ago but none now and is not currently on any medications for it.  He continues to remain asymptomatic and is overall doing really well.   Based on the above workup, I recommended bidirectional scopes, prioritizing colonoscopy.  We discussed in detail indication, risk/benefits and potential complications.  He wishes to review his options and will get back to me on potential next steps. I recommended he trend ferritin in 3 mo with his local clinic in [facility]. I encouraged he reach out to my team if he does have any questions.  He has an appointment  scheduled with me in December.     Answered all questions  Calista

## 2024-07-04 ENCOUNTER — MYC MEDICAL ADVICE (OUTPATIENT)
Dept: GASTROENTEROLOGY | Facility: CLINIC | Age: 79
End: 2024-07-04

## 2024-07-04 DIAGNOSIS — R79.89 ELEVATED FERRITIN: Primary | ICD-10-CM

## 2024-07-04 DIAGNOSIS — K65.4 MESENTERIC PANNICULITIS (H): ICD-10-CM

## 2024-07-13 ENCOUNTER — HEALTH MAINTENANCE LETTER (OUTPATIENT)
Age: 79
End: 2024-07-13

## 2024-08-06 NOTE — TELEPHONE ENCOUNTER
ROWENA ortiz note (from routing comments - pasted here for medical records)     Sonal   I spoke with Suleiman  1. Yes he is still having pain.  2. All of his pain is joint related, hands, shoulder and elbow  3. He is not having abdominal pain  4.no issues with eating    He was appreciative of the referral to hematology.    Asya

## 2024-08-06 NOTE — TELEPHONE ENCOUNTER
Asking RN to call patient to clarify message about pain     -is he still having this pain?   -where is it at? Was it the same pain that brought him in to see GI ?   -if he is having significant abdominal pain, he may need to be directed to ER   -if he is not able to tolerate orals/stay hydrated, please go to ER    I've put in a referral to heme/onc for them to see him for the elevated ferritin and mesenteric panniculitis.     SY

## 2024-08-07 ENCOUNTER — PATIENT OUTREACH (OUTPATIENT)
Dept: ONCOLOGY | Facility: CLINIC | Age: 79
End: 2024-08-07
Payer: COMMERCIAL

## 2024-08-20 DIAGNOSIS — R79.89 ELEVATED FERRITIN: Primary | ICD-10-CM

## 2024-09-05 NOTE — TELEPHONE ENCOUNTER
RECORDS STATUS - ALL OTHER DIAGNOSIS      RECORDS RECEIVED FROM: [facility]   NOTES STATUS DETAILS   OFFICE NOTE from referring provider Epic 6/7/2024 - Calista Rodriguez PA-C   MEDICATION LIST [facility]    LABS     PATHOLOGY REPORTS     ANYTHING RELATED TO DIAGNOSIS Epic 8/7/2024

## 2024-09-11 ENCOUNTER — PRE VISIT (OUTPATIENT)
Dept: ONCOLOGY | Facility: CLINIC | Age: 79
End: 2024-09-11
Payer: COMMERCIAL

## 2024-09-11 ENCOUNTER — ONCOLOGY VISIT (OUTPATIENT)
Dept: ONCOLOGY | Facility: CLINIC | Age: 79
End: 2024-09-11
Attending: PHYSICIAN ASSISTANT
Payer: COMMERCIAL

## 2024-09-11 VITALS
SYSTOLIC BLOOD PRESSURE: 123 MMHG | BODY MASS INDEX: 25.77 KG/M2 | OXYGEN SATURATION: 96 % | RESPIRATION RATE: 16 BRPM | DIASTOLIC BLOOD PRESSURE: 72 MMHG | WEIGHT: 180 LBS | TEMPERATURE: 98.2 F | HEART RATE: 57 BPM | HEIGHT: 70 IN

## 2024-09-11 DIAGNOSIS — Z14.8 HEMOCHROMATOSIS CARRIER: Primary | ICD-10-CM

## 2024-09-11 DIAGNOSIS — K65.4 MESENTERIC PANNICULITIS (H): ICD-10-CM

## 2024-09-11 DIAGNOSIS — R79.89 ELEVATED FERRITIN: ICD-10-CM

## 2024-09-11 PROCEDURE — 99215 OFFICE O/P EST HI 40 MIN: CPT | Performed by: INTERNAL MEDICINE

## 2024-09-11 PROCEDURE — G0463 HOSPITAL OUTPT CLINIC VISIT: HCPCS | Performed by: INTERNAL MEDICINE

## 2024-09-11 RX ORDER — LORATADINE 10 MG/1
10 TABLET ORAL
COMMUNITY

## 2024-09-11 RX ORDER — MELOXICAM 7.5 MG/1
7.5 TABLET ORAL
COMMUNITY
Start: 2024-09-10

## 2024-09-11 ASSESSMENT — PAIN SCALES - GENERAL: PAINLEVEL: NO PAIN (0)

## 2024-09-11 NOTE — LETTER
"9/11/2024      Suleiman Davis  [address] [place] [id number]      Dear Colleague,    Thank you for referring your patient, Suleiman Davis, to the [facility] CANCER [facility]. Please see a copy of my visit note below.    Oncology Rooming Note    September 11, 2024 2:56 PM   Suleiman Davis is a 79 year old male who presents for:    Chief Complaint   Patient presents with     Hematology     Elevated ferritin - New consult     Initial Vitals: /72 (BP Location: Right arm, Patient Position: Sitting, Cuff Size: Adult Regular)   Pulse 57   Temp 98.2  F (36.8  C) (Tympanic)   Resp 16   Ht 1.778 m (5' 10\")   Wt 81.6 kg (180 lb)   SpO2 96%   BMI 25.83 kg/m   Estimated body mass index is 25.83 kg/m  as calculated from the following:    Height as of this encounter: 1.778 m (5' 10\").    Weight as of this encounter: 81.6 kg (180 lb). Body surface area is 2.01 meters squared.  No Pain (0) Comment: Data Unavailable   No LMP for male patient.  Allergies reviewed: Yes  Medications reviewed: Yes    Medications: Medication refills not needed today.  Pharmacy name entered into EPIC:    [facility] - [place], [place] - [address] [place] [address] PHARMACY - [address]    Frailty Screening:   Is the patient here for a new oncology consult visit in cancer care? 2. No      Clinical concerns:  None      Kasey Marte, [facility] Hematology and Oncology Consult Note    Patient: Suleiman Davis  MRN: [id number]  Date of Service: Sep 11, 2024       Reason for Visit    I was consulted by Calista Rodriguez PA-C for evaluation of elevated ferritin.      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  As part of this investigation " "elevated ferritin was found and mutation testing for hemochromatosis genes was performed.  Patient is a carrier of the H63D variant.  This is not usually associated with iron storage disease in the heterozygous state.  Patient's iron saturation is normal at 29%.  Patient's liver function tests are normal.  Serum ferritin is under 1000 (presently at 707).  Patient does have pet imaging abnormalities in the colon and esophagus.  I advised he proceed with recommended upper and lower GI endoscopy.  Follow-up with hematology as needed.        ______________________________________________________________________________      History of Present Illness    Mr. Suleiman Davis is a 79 year old man who has generally been in good health.  Recently he developed severe abdominal pain.  Investigation showed mesenteric panniculitis.  Further investigation showed an elevated ferritin and hemochromatosis studies showed he is heterozygous for the H63D mutation.  Presently he feels well.  He is maintaining his weight.  His appetite and digestion are normal.  He does not have chills, fevers or sweats.  He does not have cough chest pain or shortness of breath at rest.  He has no change in bowel or bladder habit.  He can go about his daily activities without difficulty.    He does not smoke or drink alcohol to excess.  He is a retired .  He is an author of short stories.    Review of systems.  Pertinent Findings are included in the History of Present Illness    Physical Exam    /72 (BP Location: Right arm, Patient Position: Sitting, Cuff Size: Adult Regular)   Pulse 57   Temp 98.2  F (36.8  C) (Tympanic)   Resp 16   Ht 1.778 m (5' 10\")   Wt 81.6 kg (180 lb)   SpO2 96%   BMI 25.83 kg/m       GENERAL APPEARANCE: Healthy appearing man in no apparent distress.  HEAD: Atraumatic; normocephalic; without lesions.  EYES: Conjunctiva, corneas and eyelids normal; pupils equal, round, reactive to light; No " Icterus.  MOUTH/OROPHARYNX: Oral mucosa intact  NECK: Supple with no nodes.  LUNGS:  Clear to auscultation and percussion with no extra sounds.  HEART: Regular rhythm and rate; S1 and S2 normal; no murmurs noted.  ABDOMEN: Soft; no masses or tenderness, no hepatosplenomegaly.  NEUROLOGIC: Alert and oriented.  No obvious focal findings.  EXTREMITIES: No cyanosis, or edema.  SKIN: No abnormal bruising or bleeding. No suspicious lesions noted on exposed skin.  PSYCHIATRIC: Mental status normal; no apparent psychiatric issues    Medications:    Current Outpatient Medications   Medication Sig Dispense Refill     loratadine (CLARITIN) 10 MG tablet Take 10 mg by mouth.       meloxicam (MOBIC) 7.5 MG tablet Take 7.5 mg by mouth.       No current facility-administered medications for this visit.           Past History    Past Medical History:   Diagnosis Date     Basal cell carcinoma      No past surgical history on file.  Allergies   Allergen Reactions     Meperidine Other (See Comments)     Other reaction(s): Other - Describe In Comment Field  Comment: GI problems, Description:   Comment: GI problems, Description:      Penicillins Rash     Other reaction(s): Other - Describe In Comment Field  Comment: Rash, Description:      Family History   Problem Relation Age of Onset     Cancer Mother         skin cancer     Social History     Socioeconomic History     Marital status:      Spouse name: None     Number of children: None     Years of education: None     Highest education level: None   Tobacco Use     Smoking status: Never     Smokeless tobacco: Never     Social Determinants of Health     Financial Resource Strain: Low Risk  (9/7/2024)    Received from [facility]    Financial Resource Strain      Difficulty of Paying Living Expenses: 3   Food Insecurity: No Food Insecurity (9/7/2024)    [facility]    Food Insecurity      Worried About  Running Out of Food in the Last Year: 1   Transportation Needs: No Transportation Needs (9/7/2024)    Received from [facility]    Transportation Needs      Lack of Transportation (Medical): 1   Social Connections: Socially Integrated (9/7/2024)    Received from [facility]    Social Connections      Frequency of Communication with Friends and Family: 0   Housing Stability: Low Risk  (9/7/2024)    Received from [facility]    Housing Stability      Unable to Pay for Housing in the Last Year: 1           Lab Results    No results found for this or any previous visit (from the past 240 hour(s)).    Imaging Results    No results found.      I spent 55 minutes on the patient's visit today.  This included preparation for the visit, face-to-face time with the patient and documentation following the visit.  It did not include teaching or procedure time.    Signed by: Peter E. Friedell, MD          Again, thank you for allowing me to participate in the care of your patient.        Sincerely,        Peter E. Friedell, MD

## 2024-09-11 NOTE — PROGRESS NOTES
[facility] Hematology and Oncology Consult Note    Patient: Suleiman Davis  MRN: [id number]  Date of Service: Sep 11, 2024       Reason for Visit    I was consulted by Calista Rodriguez PA-C for evaluation of elevated ferritin.      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  As part of this investigation elevated ferritin was found and mutation testing for hemochromatosis genes was performed.  Patient is a carrier of the H63D variant.  This is not usually associated with iron storage disease in the heterozygous state.  Patient's iron saturation is normal at 29%.  Patient's liver function tests are normal.  Serum ferritin is under 1000 (presently at 707).  Patient does have pet imaging abnormalities in the colon and esophagus.  I advised he proceed with recommended upper and lower GI endoscopy.  Follow-up with hematology as needed.        ______________________________________________________________________________      History of Present Illness    Mr. Suleiman Davis is a 79 year old man who has generally been in good health.  Recently he developed severe abdominal pain.  Investigation showed mesenteric panniculitis.  Further investigation showed an elevated ferritin and hemochromatosis studies showed he is heterozygous for the H63D mutation.  Presently he feels well.  He is maintaining his weight.  His appetite and digestion are normal.  He does not have chills, fevers or sweats.  He does not have cough chest pain or shortness of breath at rest.  He has no change in bowel or bladder habit.  He can go about his daily activities without difficulty.    He does not smoke or drink alcohol to excess.  He is a retired .  He is an author of short stories.    Review  "of systems.  Pertinent Findings are included in the History of Present Illness    Physical Exam    /72 (BP Location: Right arm, Patient Position: Sitting, Cuff Size: Adult Regular)   Pulse 57   Temp 98.2  F (36.8  C) (Tympanic)   Resp 16   Ht 1.778 m (5' 10\")   Wt 81.6 kg (180 lb)   SpO2 96%   BMI 25.83 kg/m       GENERAL APPEARANCE: Healthy appearing man in no apparent distress.  HEAD: Atraumatic; normocephalic; without lesions.  EYES: Conjunctiva, corneas and eyelids normal; pupils equal, round, reactive to light; No Icterus.  MOUTH/OROPHARYNX: Oral mucosa intact  NECK: Supple with no nodes.  LUNGS:  Clear to auscultation and percussion with no extra sounds.  HEART: Regular rhythm and rate; S1 and S2 normal; no murmurs noted.  ABDOMEN: Soft; no masses or tenderness, no hepatosplenomegaly.  NEUROLOGIC: Alert and oriented.  No obvious focal findings.  EXTREMITIES: No cyanosis, or edema.  SKIN: No abnormal bruising or bleeding. No suspicious lesions noted on exposed skin.  PSYCHIATRIC: Mental status normal; no apparent psychiatric issues    Medications:    Current Outpatient Medications   Medication Sig Dispense Refill    loratadine (CLARITIN) 10 MG tablet Take 10 mg by mouth.      meloxicam (MOBIC) 7.5 MG tablet Take 7.5 mg by mouth.       No current facility-administered medications for this visit.           Past History    Past Medical History:   Diagnosis Date    Basal cell carcinoma      No past surgical history on file.  Allergies   Allergen Reactions    Meperidine Other (See Comments)     Other reaction(s): Other - Describe In Comment Field  Comment: GI problems, Description:   Comment: GI problems, Description:     Penicillins Rash     Other reaction(s): Other - Describe In Comment Field  Comment: Rash, Description:      Family History   Problem Relation Age of Onset    Cancer Mother         skin cancer     Social History     Socioeconomic History    Marital status:      Spouse name: None "    Number of children: None    Years of education: None    Highest education level: None   Tobacco Use    Smoking status: Never    Smokeless tobacco: Never     Social Determinants of Health     Financial Resource Strain: Low Risk  (9/7/2024)    Received from [facility]    Financial Resource Strain     Difficulty of Paying Living Expenses: 3   Food Insecurity: No Food Insecurity (9/7/2024)    Received from [facility]    Food Insecurity     Worried About Running Out of Food in the Last Year: 1   Transportation Needs: No Transportation Needs (9/7/2024)    Received from [facility]    Transportation Needs     Lack of Transportation (Medical): 1   Social Connections: Socially Integrated (9/7/2024)    Received from [facility]    Social Connections     Frequency of Communication with Friends and Family: 0   Housing Stability: Low Risk  (9/7/2024)    Received from [facility]    Housing Stability     Unable to Pay for Housing in the Last Year: 1           Lab Results    No results found for this or any previous visit (from the past 240 hour(s)).    Imaging Results    No results found.      I spent 55 minutes on the patient's visit today.  This included preparation for the visit, face-to-face time with the patient and documentation following the visit.  It did not include teaching or procedure time.    Signed by: Peter E. Friedell, MD

## 2024-09-11 NOTE — PROGRESS NOTES
"Oncology Rooming Note    September 11, 2024 2:56 PM   Suleiman Davis is a 79 year old male who presents for:    Chief Complaint   Patient presents with    Hematology     Elevated ferritin - New consult     Initial Vitals: /72 (BP Location: Right arm, Patient Position: Sitting, Cuff Size: Adult Regular)   Pulse 57   Temp 98.2  F (36.8  C) (Tympanic)   Resp 16   Ht 1.778 m (5' 10\")   Wt 81.6 kg (180 lb)   SpO2 96%   BMI 25.83 kg/m   Estimated body mass index is 25.83 kg/m  as calculated from the following:    Height as of this encounter: 1.778 m (5' 10\").    Weight as of this encounter: 81.6 kg (180 lb). Body surface area is 2.01 meters squared.  No Pain (0) Comment: Data Unavailable   No LMP for male patient.  Allergies reviewed: Yes  Medications reviewed: Yes    Medications: Medication refills not needed today.  Pharmacy name entered into EPIC:    [facility] - [place], [place] - [address] [facility] - [facility] [address] [facility]    Frailty Screening:   Is the patient here for a new oncology consult visit in cancer care? 2. No      Clinical concerns:  None      Kasey Marte CMA            "

## 2024-09-13 ENCOUNTER — MYC MEDICAL ADVICE (OUTPATIENT)
Dept: GASTROENTEROLOGY | Facility: CLINIC | Age: 79
End: 2024-09-13
Payer: COMMERCIAL

## 2024-09-13 DIAGNOSIS — R79.89 ELEVATED FERRITIN: ICD-10-CM

## 2024-09-13 DIAGNOSIS — R93.3 ABNORMAL FINDING ON GI TRACT IMAGING: Primary | ICD-10-CM

## 2024-09-13 DIAGNOSIS — K65.4 MESENTERIC PANNICULITIS (H): ICD-10-CM

## 2024-09-17 ENCOUNTER — HOSPITAL ENCOUNTER (OUTPATIENT)
Facility: CLINIC | Age: 79
End: 2024-09-17
Attending: INTERNAL MEDICINE | Admitting: INTERNAL MEDICINE
Payer: COMMERCIAL

## 2024-09-17 ENCOUNTER — TELEPHONE (OUTPATIENT)
Dept: GASTROENTEROLOGY | Facility: CLINIC | Age: 79
End: 2024-09-17
Payer: COMMERCIAL

## 2024-09-17 NOTE — TELEPHONE ENCOUNTER
Pre Assessment RN Review     sent secure chat to have referral triaged.      Scheduling Status & Recommendations    Sedation: Moderate Sedation - Per RN assessment.  Location Type: ASC - Per RN assessment.    Alayna Doe RN Colorectal Cancer   Division of Gastroenterology at [facility]

## 2024-09-17 NOTE — TELEPHONE ENCOUNTER
"Endoscopy Scheduling Screen    Have you had any respiratory illness or flu-like symptoms in the last 10 days?  No    What is your communication preference for Instructions and/or Bowel Prep?   MyChart    What insurance is in the chart?  Other:  [employer]    Ordering/Referring Provider: Calista Rodriguez   (If ordering provider performs procedure, schedule with ordering provider unless otherwise instructed. )    BMI: Estimated body mass index is 25.83 kg/m  as calculated from the following:    Height as of 9/11/24: 1.778 m (5' 10\").    Weight as of 9/11/24: 81.6 kg (180 lb).     Sedation Ordered  moderate sedation.   If patient BMI > 50 do not schedule in ASC.    If patient BMI > 45 do not schedule at [facility].    Are you taking methadone or Suboxone?  NO, No RN review required.    Have you been diagnosed and are being treated for severe PTSD or severe anxiety?  NO, No RN review required.    Are you taking any prescription medications for pain 3 or more times per week?   NO, No RN review required.    Do you have a history of malignant hyperthermia?  No    (Females) Are you currently pregnant?   No     Have you been diagnosed or told you have pulmonary hypertension?   No    Do you have an LVAD?  No    Have you been told you have moderate to severe sleep apnea?  No.    Have you been told you have COPD, asthma, or any other lung disease?  No    Do you have any heart conditions?  No     Have you ever had or are you waiting for an organ transplant?  No. Continue scheduling, no site restrictions.    Have you had a stroke or transient ischemic attack (TIA aka \"mini stroke\" in the last 6 months?   No    Have you been diagnosed with or been told you have cirrhosis of the liver?   No.    Are you currently on dialysis?   No    Do you need assistance transferring?   No    BMI: Estimated body mass index is 25.83 kg/m  as calculated from the following:    Height as of 9/11/24: 1.778 m (5' 10\").    Weight as of 9/11/24: 81.6 kg (180 lb).     Is " patients BMI > 40 and scheduling location [facility]?  No    Do you take an injectable or oral medication for weight loss or diabetes (excluding insulin)?  No    Do you take the medication Naltrexone?  No    Do you take blood thinners?  No       Prep   Are you currently on dialysis or do you have chronic kidney disease?  No    Do you have a diagnosis of diabetes?  No    Do you have a diagnosis of cystic fibrosis (CF)?  No    On a regular basis do you go 3 -5 days between bowel movements?  No    BMI > 40?  No    Preferred Pharmacy:    [facility]. [address]  [facility] [address]  Phone: [phone] Fax: [phone]          Final Scheduling Details     Procedure scheduled  Colonoscopy / Upper endoscopy (EGD)    Surgeon:  Mor     Date of procedure:  12/20     Pre-OP / PAC:   No - Not required for this site.    Location  [facility] - Per order. Needs GI and prefers not to go to [facility]    Sedation   Moderate Sedation - Per order.      Patient Reminders:   You will receive a call from a Nurse to review instructions and health history.  This assessment must be completed prior to your procedure.  Failure to complete the Nurse assessment may result in the procedure being cancelled.      On the day of your procedure, please designate an adult(s) who can drive you home stay with you for the next 24 hours. The medicines used in the exam will make you sleepy. You will not be able to drive.      You cannot take public transportation, ride share services, or non-medical taxi service without a responsible caregiver.  Medical transport services are allowed with the requirement that a responsible caregiver will receive you at your destination.  We require that drivers and caregivers are confirmed prior to your procedure.

## 2024-09-19 ENCOUNTER — TELEPHONE (OUTPATIENT)
Dept: GASTROENTEROLOGY | Facility: CLINIC | Age: 79
End: 2024-09-19
Payer: COMMERCIAL

## 2024-09-19 NOTE — TELEPHONE ENCOUNTER
Caller:     Reason for Reschedule/Cancellation   (please be detailed, any staff messages or encounters to note?): CAN'T DO THIS DAY      Prior to reschedule please review:  Ordering Provider:     ANTONIA MANJARREZ     Sedation Determined: CS  Does patient have any ASC Exclusions, please identify?:       Notes on Cancelled Procedure:  Procedure: Upper and Lower Endoscopy [EGD and Colonoscopy]   Date: 12/20  Location: [facility]; [address], [address], [place] [address]   Surgeon: GRISELDA      Rescheduled: No, WILL CALL BACK FOR APRIL NEXT YR       Did you cancel or rescheduled an [facility] procedure? No.

## 2025-05-12 ENCOUNTER — OFFICE VISIT (OUTPATIENT)
Dept: DERMATOLOGY | Facility: CLINIC | Age: 80
End: 2025-05-12
Payer: COMMERCIAL

## 2025-05-12 DIAGNOSIS — Z12.83 SKIN CANCER SCREENING: ICD-10-CM

## 2025-05-12 DIAGNOSIS — D22.9 MULTIPLE BENIGN NEVI: Primary | ICD-10-CM

## 2025-05-12 DIAGNOSIS — L82.1 SEBORRHEIC KERATOSES: ICD-10-CM

## 2025-05-12 DIAGNOSIS — D18.01 CHERRY ANGIOMA: ICD-10-CM

## 2025-05-12 DIAGNOSIS — L57.0 ACTINIC KERATOSES: ICD-10-CM

## 2025-05-12 DIAGNOSIS — L40.8 SEBOPSORIASIS: ICD-10-CM

## 2025-05-12 PROCEDURE — 17000 DESTRUCT PREMALG LESION: CPT | Performed by: PHYSICIAN ASSISTANT

## 2025-05-12 PROCEDURE — 99213 OFFICE O/P EST LOW 20 MIN: CPT | Mod: 25 | Performed by: PHYSICIAN ASSISTANT

## 2025-05-12 PROCEDURE — 17003 DESTRUCT PREMALG LES 2-14: CPT | Performed by: PHYSICIAN ASSISTANT

## 2025-05-12 RX ORDER — KETOCONAZOLE 20 MG/ML
SHAMPOO, SUSPENSION TOPICAL
Qty: 120 ML | Refills: 11 | Status: SHIPPED | OUTPATIENT
Start: 2025-05-12

## 2025-05-12 RX ORDER — FLUOCINONIDE TOPICAL SOLUTION USP, 0.05% 0.5 MG/ML
SOLUTION TOPICAL 2 TIMES DAILY
Qty: 60 ML | Refills: 4 | Status: SHIPPED | OUTPATIENT
Start: 2025-05-12

## 2025-05-12 NOTE — LETTER
5/12/2025      Suleiman Davis  [address] [place] [id number]      Dear Colleague,    Thank you for referring your patient, Suleiman Davis, to the [facility]. Please see a copy of my visit note below.    [facility] Dermatology Note  Encounter Date: May 12, 2025  Office Visit     Reviewed patients past medical history and pertinent chart review prior to patients visit today.     Dermatology Problem List:  Last skin check: 5/12/25    1.  Basal cell carcinoma, left medial canthus, status post Mohs 12/21/2016  2.  Actinic keratoses, status post cryotherapy  3.  Psoriasis, triamcinolone 0.1% cream as needed  4.  Sebopsoriasis, ketoconazole 2% shampoo and fluocinonide 0.05% solution 5/12/2025    ____________________________________________      CC: Skin Check    HPI:  Mr. Suleiman Davis is a(n) 80 year old male who presents today as a return patient for a full body skin cancer screening. The patient has a history of basal cell carcinoma involving the left medial canthus, status post Mohs 12/21/2016.  Today, the patient has a concern of a new brown spot involving the chest that has been present for about 7 months.  He tries to be diligent with photoprotection now.  However, the patient did grow up in [facility].    Medications:  Current Outpatient Medications   Medication Sig Dispense Refill     fluocinonide (LIDEX) 0.05 % external solution Apply topically 2 times daily. Apply to affected areas on scalp for 2-3 weeks, then as needed for flares. 60 mL 4     ketoconazole (NIZORAL) 2 % external shampoo Apply topically three times a week. Apply to affected areas on scalp. Lather for 5 minutes prior to rinsing. 120 mL 11     loratadine (CLARITIN) 10 MG tablet Take 10 mg by mouth. (Patient not taking: Reported on 12/11/2024)       meloxicam (MOBIC) 7.5 MG tablet Take 7.5 mg by mouth.       No current facility-administered medications for this visit.      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   SKIN: Total skin excluding the genitalia areas was performed. The exam included the scalp, face, neck, bilateral arms, chest, back, abdomen, bilateral legs, digits, mons pubis, buttocks, and nails.   - Chandler II.  - Multiple tan/brown macules and papules scattered throughout exam, consistent with benign nevi, many of which were examined under dermoscopy with no concerning features found  - Scattered tan, homogenous macules on sun exposed skin, consistent with solar lentigines  - Scattered waxy, stuck on appearing papules and patches, consistent with seborrheic keratoses, including the patient's lesion of concern on the chest  - Several 1-2 mm red dome shaped symmetric papules, consistent with cherry angiomas  -Left medial canthus, well healed scar with no signs of skin cancer recurrence   - Right temple x 1 and nose x 3, pink to red rough adherent papules, consistent with actinic keratoses    - Right axilla, flesh colored subcutaneous nodule with an overlying punctum most consistent with an epidermal cyst  - Scalp, pink patches with overlying white scaling    - No other lesions of concern on areas examined    ____________________________________________      Assessment & Plan:   # Personal history of nonmelanoma skin cancer, basal cell carcinoma, left medial canthus, status post Mohs 12/21/2016  - No signs of recurrence. Continued observation recommended.   - Sun protection: Counseled SPF 30+ sunscreen, UPF clothing, sun avoidance, tanning bed avoidance.    # Nevi, trunk and extremities  # Solar lentigines  - Nevi demonstrate no concerning features on dermoscopy. We discussed the importance of self exams  at home.   - ABCDEs: Counseled ABCDEs of melanoma: Asymmetry, Border (irregularity), Color (not uniform, changes in color), Diameter (greater than 6 mm which is about the size of a pencil eraser), and Evolving (any changes in preexisting moles).    # Cherry angiomas  # Seborrheic keratoses  - We discussed the benign nature of the skin lesions. No treatment required. Continued observation recommended. Follow up with any concerns or changes.      # Actinic keratoses  Actinic keratoses are pre-cancerous skin growths caused by sun exposure. Treatment is recommended and medically indicated. Treated with cryotherapy as outlined below.     Procedures performed: Cryotherapy  - Location(s): Right temple x 1 and nose x 3. After verbal consent and discussion of risks and benefits including, but not limited to, dyspigmentation/scar, blister, and pain, 4 lesion(s) was(were) treated with 1-2 mm freeze border for 1-2 cycles with liquid nitrogen. Post cryotherapy instructions were provided. The patient should return if the lesions do not resolve.      # Epidermal cyst, right axilla  - Benign, no further treatment needed  - If lesion becomes bothersome, could consider excision in future  - Return for reassessment if changes or symptoms occur     # Sebopsoriasis  # History of psoriasis  For the patient's scalp, I have prescribed ketoconazole 2% shampoo for the patient to use 3 times weekly.  The shampoo should be lathered for about 5 minutes prior to rinsing.  I have also prescribed fluocinonide 0.05% solution for the patient to apply to the affected areas on the scalp twice daily for 2 to 3 weeks, then as needed for flares.  However, fluocinonide should not be applied daily long-term due to risk of atrophy.    The patient's psoriasis elsewhere on his skin appears well-controlled at this time.  He denies needing refills of triamcinolone 0.1% cream.    Follow-up:  Annual for follow up full body skin exam, as needed for new or changing  lesions or new concerns    All risks, benefits and alternatives were discussed with patient.  Patient is in agreement and understands the assessment and plan.  All questions were answered.    Faby Parker PA-C  [facility]ermatology      CC Referred Self, MD  No address on file on close of this encounter.    Again, thank you for allowing me to participate in the care of your patient.        Sincerely,        Faby Parker PA-C    Electronically signed

## 2025-05-13 NOTE — PROGRESS NOTES
[facility] Dermatology Note  Encounter Date: May 12, 2025  Office Visit     Reviewed patients past medical history and pertinent chart review prior to patients visit today.     Dermatology Problem List:  Last skin check: 5/12/25    1.  Basal cell carcinoma, left medial canthus, status post Mohs 12/21/2016  2.  Actinic keratoses, status post cryotherapy  3.  Psoriasis, triamcinolone 0.1% cream as needed  4.  Sebopsoriasis, ketoconazole 2% shampoo and fluocinonide 0.05% solution 5/12/2025    ____________________________________________      CC: Skin Check    HPI:  Mr. Suleiman Davis is a(n) 80 year old male who presents today as a return patient for a full body skin cancer screening. The patient has a history of basal cell carcinoma involving the left medial canthus, status post Mohs 12/21/2016.  Today, the patient has a concern of a new brown spot involving the chest that has been present for about 7 months.  He tries to be diligent with photoprotection now.  However, the patient did grow up in [facility].    Medications:  Current Outpatient Medications   Medication Sig Dispense Refill    fluocinonide (LIDEX) 0.05 % external solution Apply topically 2 times daily. Apply to affected areas on scalp for 2-3 weeks, then as needed for flares. 60 mL 4    ketoconazole (NIZORAL) 2 % external shampoo Apply topically three times a week. Apply to affected areas on scalp. Lather for 5 minutes prior to rinsing. 120 mL 11    loratadine (CLARITIN) 10 MG tablet Take 10 mg by mouth. (Patient not taking: Reported on 12/11/2024)      meloxicam (MOBIC) 7.5 MG tablet Take 7.5 mg by mouth.       No current facility-administered medications for this visit.      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   SKIN: Total skin excluding the genitalia areas was performed. The exam included the scalp, face, neck, bilateral arms, chest, back, abdomen, bilateral legs, digits, mons pubis, buttocks, and nails.   - Chandler II.  - Multiple tan/brown macules and papules scattered throughout exam, consistent with benign nevi, many of which were examined under dermoscopy with no concerning features found  - Scattered tan, homogenous macules on sun exposed skin, consistent with solar lentigines  - Scattered waxy, stuck on appearing papules and patches, consistent with seborrheic keratoses, including the patient's lesion of concern on the chest  - Several 1-2 mm red dome shaped symmetric papules, consistent with cherry angiomas  -Left medial canthus, well healed scar with no signs of skin cancer recurrence   - Right temple x 1 and nose x 3, pink to red rough adherent papules, consistent with actinic keratoses    - Right axilla, flesh colored subcutaneous nodule with an overlying punctum most consistent with an epidermal cyst  - Scalp, pink patches with overlying white scaling    - No other lesions of concern on areas examined    ____________________________________________      Assessment & Plan:   # Personal history of nonmelanoma skin cancer, basal cell carcinoma, left medial canthus, status post Mohs 12/21/2016  - No signs of recurrence. Continued observation recommended.   - Sun protection: Counseled SPF 30+ sunscreen, UPF clothing, sun avoidance, tanning bed avoidance.    # Nevi, trunk and extremities  # Solar lentigines  - Nevi demonstrate no concerning features on dermoscopy. We discussed the importance of self exams at home.   - ABCDEs: Counseled ABCDEs of melanoma: Asymmetry, Border (irregularity), Color (not uniform, changes in color), Diameter (greater than 6 mm which is about the size of a pencil  eraser), and Evolving (any changes in preexisting moles).    # Cherry angiomas  # Seborrheic keratoses  - We discussed the benign nature of the skin lesions. No treatment required. Continued observation recommended. Follow up with any concerns or changes.      # Actinic keratoses  Actinic keratoses are pre-cancerous skin growths caused by sun exposure. Treatment is recommended and medically indicated. Treated with cryotherapy as outlined below.     Procedures performed: Cryotherapy  - Location(s): Right temple x 1 and nose x 3. After verbal consent and discussion of risks and benefits including, but not limited to, dyspigmentation/scar, blister, and pain, 4 lesion(s) was(were) treated with 1-2 mm freeze border for 1-2 cycles with liquid nitrogen. Post cryotherapy instructions were provided. The patient should return if the lesions do not resolve.      # Epidermal cyst, right axilla  - Benign, no further treatment needed  - If lesion becomes bothersome, could consider excision in future  - Return for reassessment if changes or symptoms occur     # Sebopsoriasis  # History of psoriasis  For the patient's scalp, I have prescribed ketoconazole 2% shampoo for the patient to use 3 times weekly.  The shampoo should be lathered for about 5 minutes prior to rinsing.  I have also prescribed fluocinonide 0.05% solution for the patient to apply to the affected areas on the scalp twice daily for 2 to 3 weeks, then as needed for flares.  However, fluocinonide should not be applied daily long-term due to risk of atrophy.    The patient's psoriasis elsewhere on his skin appears well-controlled at this time.  He denies needing refills of triamcinolone 0.1% cream.    Follow-up:  Annual for follow up full body skin exam, as needed for new or changing lesions or new concerns    All risks, benefits and alternatives were discussed with patient.  Patient is in agreement and understands the assessment and plan.  All questions were  answered.    Faby Parker PA-C  [facility] Dermat[facility]      CC Referred Self, MD  No address on file on close of this encounter.

## 2025-07-19 ENCOUNTER — HEALTH MAINTENANCE LETTER (OUTPATIENT)
Age: 80
End: 2025-07-19